# Patient Record
Sex: FEMALE | Race: WHITE | NOT HISPANIC OR LATINO | Employment: FULL TIME
[De-identification: names, ages, dates, MRNs, and addresses within clinical notes are randomized per-mention and may not be internally consistent; named-entity substitution may affect disease eponyms.]

---

## 2017-11-12 ENCOUNTER — HEALTH MAINTENANCE LETTER (OUTPATIENT)
Age: 37
End: 2017-11-12

## 2022-06-21 ENCOUNTER — TRANSFERRED RECORDS (OUTPATIENT)
Dept: MULTI SPECIALTY CLINIC | Facility: CLINIC | Age: 42
End: 2022-06-21

## 2022-06-21 LAB
HPV ABSTRACT: NORMAL
PAP-ABSTRACT: NORMAL

## 2023-02-28 ASSESSMENT — ASTHMA QUESTIONNAIRES
ACT_TOTALSCORE: 25
QUESTION_4 LAST FOUR WEEKS HOW OFTEN HAVE YOU USED YOUR RESCUE INHALER OR NEBULIZER MEDICATION (SUCH AS ALBUTEROL): NOT AT ALL
QUESTION_1 LAST FOUR WEEKS HOW MUCH OF THE TIME DID YOUR ASTHMA KEEP YOU FROM GETTING AS MUCH DONE AT WORK, SCHOOL OR AT HOME: NONE OF THE TIME
QUESTION_2 LAST FOUR WEEKS HOW OFTEN HAVE YOU HAD SHORTNESS OF BREATH: NOT AT ALL
ACT_TOTALSCORE: 25
QUESTION_3 LAST FOUR WEEKS HOW OFTEN DID YOUR ASTHMA SYMPTOMS (WHEEZING, COUGHING, SHORTNESS OF BREATH, CHEST TIGHTNESS OR PAIN) WAKE YOU UP AT NIGHT OR EARLIER THAN USUAL IN THE MORNING: NOT AT ALL
QUESTION_5 LAST FOUR WEEKS HOW WOULD YOU RATE YOUR ASTHMA CONTROL: COMPLETELY CONTROLLED

## 2023-03-02 ASSESSMENT — ENCOUNTER SYMPTOMS
HEADACHES: 0
ABDOMINAL PAIN: 0
DYSURIA: 0
COUGH: 0
NAUSEA: 0
FREQUENCY: 0
ARTHRALGIAS: 0
HEMATOCHEZIA: 0
NERVOUS/ANXIOUS: 0
CONSTIPATION: 0
BREAST MASS: 0
CHILLS: 0
HEMATURIA: 0
HEARTBURN: 0
PARESTHESIAS: 0
DIARRHEA: 0
SORE THROAT: 0
EYE PAIN: 0
WEAKNESS: 0
MYALGIAS: 0
DIZZINESS: 0
JOINT SWELLING: 0
SHORTNESS OF BREATH: 0
PALPITATIONS: 0
FEVER: 0

## 2023-03-03 ENCOUNTER — OFFICE VISIT (OUTPATIENT)
Dept: FAMILY MEDICINE | Facility: CLINIC | Age: 43
End: 2023-03-03
Payer: COMMERCIAL

## 2023-03-03 VITALS
SYSTOLIC BLOOD PRESSURE: 114 MMHG | TEMPERATURE: 97.3 F | HEIGHT: 62 IN | OXYGEN SATURATION: 96 % | BODY MASS INDEX: 30.91 KG/M2 | DIASTOLIC BLOOD PRESSURE: 66 MMHG | WEIGHT: 168 LBS | HEART RATE: 91 BPM

## 2023-03-03 DIAGNOSIS — E66.9 OBESITY (BMI 30-39.9): ICD-10-CM

## 2023-03-03 DIAGNOSIS — J45.20 MILD INTERMITTENT ASTHMA, UNSPECIFIED WHETHER COMPLICATED: ICD-10-CM

## 2023-03-03 DIAGNOSIS — Z00.00 ROUTINE GENERAL MEDICAL EXAMINATION AT A HEALTH CARE FACILITY: Primary | ICD-10-CM

## 2023-03-03 DIAGNOSIS — E83.51 LOW CALCIUM LEVELS: ICD-10-CM

## 2023-03-03 DIAGNOSIS — Z12.31 ENCOUNTER FOR SCREENING MAMMOGRAM FOR BREAST CANCER: ICD-10-CM

## 2023-03-03 DIAGNOSIS — Z13.220 SCREENING FOR HYPERLIPIDEMIA: ICD-10-CM

## 2023-03-03 DIAGNOSIS — E03.9 HYPOTHYROIDISM, UNSPECIFIED TYPE: ICD-10-CM

## 2023-03-03 DIAGNOSIS — Z86.2 HISTORY OF ANEMIA: ICD-10-CM

## 2023-03-03 LAB
ALBUMIN SERPL BCG-MCNC: 4.6 G/DL (ref 3.5–5.2)
ALP SERPL-CCNC: 102 U/L (ref 35–104)
ALT SERPL W P-5'-P-CCNC: 13 U/L (ref 10–35)
ANION GAP SERPL CALCULATED.3IONS-SCNC: 12 MMOL/L (ref 7–15)
AST SERPL W P-5'-P-CCNC: 28 U/L (ref 10–35)
BILIRUB SERPL-MCNC: 0.3 MG/DL
BUN SERPL-MCNC: 14.5 MG/DL (ref 6–20)
CALCIUM SERPL-MCNC: 10.2 MG/DL (ref 8.6–10)
CHLORIDE SERPL-SCNC: 103 MMOL/L (ref 98–107)
CHOLEST SERPL-MCNC: 227 MG/DL
CREAT SERPL-MCNC: 0.65 MG/DL (ref 0.51–0.95)
DEPRECATED HCO3 PLAS-SCNC: 25 MMOL/L (ref 22–29)
ERYTHROCYTE [DISTWIDTH] IN BLOOD BY AUTOMATED COUNT: 12.1 % (ref 10–15)
FERRITIN SERPL-MCNC: 63 NG/ML (ref 6–175)
GFR SERPL CREATININE-BSD FRML MDRD: >90 ML/MIN/1.73M2
GLUCOSE SERPL-MCNC: 98 MG/DL (ref 70–99)
HCT VFR BLD AUTO: 40.6 % (ref 35–47)
HDLC SERPL-MCNC: 60 MG/DL
HGB BLD-MCNC: 14.1 G/DL (ref 11.7–15.7)
IRON BINDING CAPACITY (ROCHE): 287 UG/DL (ref 240–430)
IRON SATN MFR SERPL: 37 % (ref 15–46)
IRON SERPL-MCNC: 106 UG/DL (ref 37–145)
LDLC SERPL CALC-MCNC: 143 MG/DL
MCH RBC QN AUTO: 29.7 PG (ref 26.5–33)
MCHC RBC AUTO-ENTMCNC: 34.7 G/DL (ref 31.5–36.5)
MCV RBC AUTO: 86 FL (ref 78–100)
NONHDLC SERPL-MCNC: 167 MG/DL
PLATELET # BLD AUTO: 342 10E3/UL (ref 150–450)
POTASSIUM SERPL-SCNC: 4.4 MMOL/L (ref 3.4–5.3)
PROT SERPL-MCNC: 8.3 G/DL (ref 6.4–8.3)
RBC # BLD AUTO: 4.74 10E6/UL (ref 3.8–5.2)
SODIUM SERPL-SCNC: 140 MMOL/L (ref 136–145)
T3FREE SERPL-MCNC: 2.9 PG/ML (ref 2–4.4)
T4 FREE SERPL-MCNC: 1.28 NG/DL (ref 0.9–1.7)
TRIGL SERPL-MCNC: 122 MG/DL
TSH SERPL DL<=0.005 MIU/L-ACNC: 1.12 UIU/ML (ref 0.3–4.2)
WBC # BLD AUTO: 8.5 10E3/UL (ref 4–11)

## 2023-03-03 PROCEDURE — 99214 OFFICE O/P EST MOD 30 MIN: CPT | Mod: 25 | Performed by: NURSE PRACTITIONER

## 2023-03-03 PROCEDURE — 84481 FREE ASSAY (FT-3): CPT | Performed by: NURSE PRACTITIONER

## 2023-03-03 PROCEDURE — 36415 COLL VENOUS BLD VENIPUNCTURE: CPT | Performed by: NURSE PRACTITIONER

## 2023-03-03 PROCEDURE — 83550 IRON BINDING TEST: CPT | Performed by: NURSE PRACTITIONER

## 2023-03-03 PROCEDURE — 84443 ASSAY THYROID STIM HORMONE: CPT | Performed by: NURSE PRACTITIONER

## 2023-03-03 PROCEDURE — 99386 PREV VISIT NEW AGE 40-64: CPT | Performed by: NURSE PRACTITIONER

## 2023-03-03 PROCEDURE — 80061 LIPID PANEL: CPT | Performed by: NURSE PRACTITIONER

## 2023-03-03 PROCEDURE — 84439 ASSAY OF FREE THYROXINE: CPT | Performed by: NURSE PRACTITIONER

## 2023-03-03 PROCEDURE — 80053 COMPREHEN METABOLIC PANEL: CPT | Performed by: NURSE PRACTITIONER

## 2023-03-03 PROCEDURE — 82728 ASSAY OF FERRITIN: CPT | Performed by: NURSE PRACTITIONER

## 2023-03-03 PROCEDURE — 83540 ASSAY OF IRON: CPT | Performed by: NURSE PRACTITIONER

## 2023-03-03 PROCEDURE — 85027 COMPLETE CBC AUTOMATED: CPT | Performed by: NURSE PRACTITIONER

## 2023-03-03 RX ORDER — ALBUTEROL SULFATE 90 UG/1
2 AEROSOL, METERED RESPIRATORY (INHALATION) EVERY 6 HOURS PRN
Qty: 18 G | Refills: 3 | Status: SHIPPED | OUTPATIENT
Start: 2023-03-03

## 2023-03-03 RX ORDER — LEVOTHYROXINE SODIUM 50 MCG
TABLET ORAL
COMMUNITY
Start: 2022-12-28 | End: 2024-04-03

## 2023-03-03 RX ORDER — CHOLECALCIFEROL (VITAMIN D3) 50 MCG
TABLET ORAL
COMMUNITY

## 2023-03-03 ASSESSMENT — ENCOUNTER SYMPTOMS
DYSURIA: 0
ARTHRALGIAS: 0
DIARRHEA: 0
COUGH: 0
FEVER: 0
NERVOUS/ANXIOUS: 0
BREAST MASS: 0
WEAKNESS: 0
EYE PAIN: 0
DIZZINESS: 0
HEMATOCHEZIA: 0
NAUSEA: 0
CONSTIPATION: 0
HEADACHES: 0
PALPITATIONS: 0
ABDOMINAL PAIN: 0
FREQUENCY: 0
SORE THROAT: 0
CHILLS: 0
JOINT SWELLING: 0
HEMATURIA: 0
PARESTHESIAS: 0
SHORTNESS OF BREATH: 0
MYALGIAS: 0
HEARTBURN: 0

## 2023-03-03 NOTE — LETTER
My Asthma Action Plan    Name: Pily Vargas   YOB: 1980  Date: 3/3/2023   My doctor: TRELL Omalley CNP   My clinic: [facility] [place]        My Rescue Medicine:   Albuterol inhaler (Proair/Ventolin/Proventil HFA)  2-4 puffs EVERY 4 HOURS as needed. Use a spacer if recommended by your provider.   My Asthma Severity:   Intermittent / Exercise Induced  Know your asthma triggers: upper respiratory infections             GREEN ZONE   Good Control    I feel good    No cough or wheeze    Can work, sleep and play without asthma symptoms       Take your asthma control medicine every day.     1. If exercise triggers your asthma, take your rescue medication    15 minutes before exercise or sports, and    During exercise if you have asthma symptoms  2. Spacer to use with inhaler: If you have a spacer, make sure to use it with your inhaler             YELLOW ZONE Getting Worse  I have ANY of these:    I do not feel good    Cough or wheeze    Chest feels tight    Wake up at night   1. Keep taking your Green Zone medications  2. Start taking your rescue medicine:    every 20 minutes for up to 1 hour. Then every 4 hours for 24-48 hours.  3. If you stay in the Yellow Zone for more than 12-24 hours, contact your doctor.  4. If you do not return to the Green Zone in 12-24 hours or you get worse, start taking your oral steroid medicine if prescribed by your provider.           RED ZONE Medical Alert - Get Help  I have ANY of these:    I feel awful    Medicine is not helping    Breathing getting harder    Trouble walking or talking    Nose opens wide to breathe       1. Take your rescue medicine NOW  2. If your provider has prescribed an oral steroid medicine, start taking it NOW  3. Call your doctor NOW  4. If you are still in the Red Zone after 20 minutes and you have not reached your doctor:    Take your rescue medicine again and    Call 911 or go to the emergency room right away    See your  regular doctor within 2 weeks of an Emergency Room or Urgent Care visit for follow-up treatment.          Annual Reminders:  Meet with Asthma Educator,  Flu Shot in the Fall, consider Pneumonia Vaccination for patients with asthma (aged 19 and older).    Pharmacy:    [id number] [address]  [id number] [place] [facility] [address] [place] [address] [address]  [id number] [place], [place] - [address] [facility] [place] - [address]    Electronically signed by TRELL Omalley CNP   Date: 03/03/23                    Asthma Triggers  How To Control Things That Make Your Asthma Worse    Triggers are things that make your asthma worse.  Look at the list below to help you find your triggers and   what you can do about them. You can help prevent asthma flare-ups by staying away from your triggers.      Trigger                                                          What you can do   Cigarette Smoke  Tobacco smoke can make asthma worse. Do not allow smoking in your home, car or around you.  Be sure no one smokes at a child s day care or school.  If you smoke, ask your health care provider for ways to help you quit.  Ask family members to quit too.  Ask your health care provider for a referral to Quit Plan to help you quit smoking, or call 5-459-284-PLAN.     Colds, Flu, Bronchitis  These are common triggers of asthma. Wash your hands often.  Don t touch your eyes, nose or mouth.  Get a flu shot every year.     Dust Mites  These are tiny bugs that live in cloth or carpet. They are too small to see. Wash sheets and blankets in hot water every week.   Encase pillows and mattress in dust mite proof covers.  Avoid having carpet if you can. If you have carpet, vacuum weekly.   Use a dust mask and HEPA vacuum.   Pollen and Outdoor Mold  Some people are allergic to trees, grass, or weed pollen, or molds. Try to keep your windows  closed.  Limit time out doors when pollen count is high.   Ask you health care provider about taking medicine during allergy season.     Animal Dander  Some people are allergic to skin flakes, urine or saliva from pets with fur or feathers. Keep pets with fur or feathers out of your home.    If you can t keep the pet outdoors, then keep the pet out of your bedroom.  Keep the bedroom door closed.  Keep pets off cloth furniture and away from stuffed toys.     Mice, Rats, and Cockroaches  Some people are allergic to the waste from these pests.   Cover food and garbage.  Clean up spills and food crumbs.  Store grease in the refrigerator.   Keep food out of the bedroom.   Indoor Mold  This can be a trigger if your home has high moisture. Fix leaking faucets, pipes, or other sources of water.   Clean moldy surfaces.  Dehumidify basement if it is damp and smelly.   Smoke, Strong Odors, and Sprays  These can reduce air quality. Stay away from strong odors and sprays, such as perfume, powder, hair spray, paints, smoke incense, paint, cleaning products, candles and new carpet.   Exercise or Sports  Some people with asthma have this trigger. Be active!  Ask your doctor about taking medicine before sports or exercise to prevent symptoms.    Warm up for 5-10 minutes before and after sports or exercise.     Other Triggers of Asthma  Cold air:  Cover your nose and mouth with a scarf.  Sometimes laughing or crying can be a trigger.  Some medicines and food can trigger asthma.

## 2023-03-03 NOTE — PROGRESS NOTES
SUBJECTIVE:   CC: Pily is an 42 year old who presents for preventive health visit.   Patient has been advised of split billing requirements and indicates understanding: Yes  Healthy Habits:     Getting at least 3 servings of Calcium per day:  NO    Bi-annual eye exam:  Yes    Dental care twice a year:  Yes    Sleep apnea or symptoms of sleep apnea:  None    Diet:  Regular (no restrictions)    Frequency of exercise:  1 day/week    Duration of exercise:  15-30 minutes    Taking medications regularly:  Yes    Medication side effects:  None    PHQ-2 Total Score: 0    Additional concerns today:  Yes    Jeremy luciano of  team.    OBGYN for pap and thyroid management requests thyroid labs here.     Please abstract the following data from this visit with this patient into the appropriate field in Epic:    Tests that can be patient reported without a hard copy:    Pap smear done on this date:  (approximately), by this group: [facility] results were normal     Note to Abstraction: If this section is blank, no results were found via Chart Review/Care Everywhere.            1/16/2014     8:08 AM 1/16/2014     8:49 AM 2/24/2023    10:13 AM   ACT Total Scores   ACT Total Score 21 21    asthma er visits 0 = None 0 = None    Asthma hosp visits 0 = None 0 = None    ACT TOTAL SCORE (Goal Greater than or Equal to 20)   25    25   In the past 12 months, how many times did you visit the emergency room for your asthma without being admitted to the hospital?   0    0   In the past 12 months, how many times were you hospitalized overnight because of your asthma?   0    0       Today's PHQ-2 Score:       3/2/2023     3:49 PM   PHQ-2 ( 1999 [employer])   Q1: Little interest or pleasure in doing things 0   Q2: Feeling down, depressed or hopeless 0   PHQ-2 Score 0   Q1: Little interest or pleasure in doing things Not at all   Q2: Feeling down, depressed or hopeless Not at all   PHQ-2 Score 0       Have you ever  done Advance Care Planning? (For example, a Health Directive, POLST, or a discussion with a medical provider or your loved ones about your wishes): Declined    Social History     Tobacco Use     Smoking status: Never     Smokeless tobacco: Never   Vaping Use     Vaping status: Not on file   Substance Use Topics     Alcohol use: Yes     Comment: So very rarely           3/2/2023     3:49 PM   Alcohol Use   Prescreen: >3 drinks/day or >7 drinks/week? Not Applicable          View : No data to display.                Reviewed orders with patient.  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     Past Surgical History:   Procedure Laterality Date     ABDOMEN SURGERY  2022         BIOPSY  1997    Lung issues when I was young     COLONOSCOPY  2020    Ulcerative Proctitis     GYN SURGERY  2022         no surgeries         Social History     Tobacco Use     Smoking status: Never     Smokeless tobacco: Never   Vaping Use     Vaping status: Not on file   Substance Use Topics     Alcohol use: Yes     Comment: So very rarely     Family History   Problem Relation Age of Onset     Cancer Maternal Grandmother         Pancreatic      Diabetes Maternal Grandmother      Cardiovascular Maternal Grandmother         Few MI's         Current Outpatient Medications   Medication Sig Dispense Refill     albuterol (PROAIR HFA/PROVENTIL HFA/VENTOLIN HFA) 108 (90 Base) MCG/ACT inhaler Inhale 2 puffs into the  lungs every 6 hours as needed for shortness of breath Profile Rx: patient will contact pharmacy when needed 18 g 3     Ferrous Sulfate 50 MG TBCR        loratadine (CLARITIN) 10 MG tablet Take 1 tablet by mouth daily. 90 tablet 3     methylcellulose (CITRUCEL) powder        Multiple Vitamin (MULTI-VITAMIN) per tablet Take 1 tablet by mouth daily. 90 tablet 3     SYNTHROID 50 MCG tablet        vitamin D3 (CHOLECALCIFEROL) 50 mcg (2000 units) tablet        Allergies   Allergen Reactions     Dust Mites Other (See Comments)     Minocycline Hives     Pt thinks it was this one, it was prescribed by her dermatologist.        Breast Cancer Screenin/24/2023    10:13 AM 3/16/2023     8:50 AM   Breast CA Risk Assessment (FHS-7)   Do you have a family history of breast, colon, or ovarian cancer? No / Unknown    No / Unknown No / Unknown     Mammogram Screening - Offered annual screening and updated Health Maintenance for mutual plan based on risk factor consideration    Pertinent mammograms are reviewed under the imaging tab.    History of abnormal Pap smear: NO - age 30-65 PAP every 3 years with negative HPV co-testing recommended      2014    12:00 AM 1/15/2013     2:19 PM 2012     7:59 AM   PAP / HPV   PAP (Historical) NIL   NIL   ASC-US       Reviewed and updated as needed this visit by clinical staff   Tobacco  Allergies  Meds  Problems  Med Hx  Surg Hx  Fam Hx          Reviewed and updated as needed this visit by Provider   Tobacco  Allergies  Meds  Problems  Med Hx  Surg Hx  Fam Hx           Review of Systems   Constitutional: Negative for chills and fever.   HENT: Negative for congestion, ear pain, hearing loss and sore throat.    Eyes: Negative for pain and visual disturbance.   Respiratory: Negative for cough and shortness of breath.    Cardiovascular: Negative for chest pain, palpitations and peripheral edema.   Gastrointestinal: Negative for abdominal pain, constipation, diarrhea,  "heartburn, hematochezia and nausea.   Breasts:  Negative for tenderness, breast mass and discharge.   Genitourinary: Negative for dysuria, frequency, genital sores, hematuria, pelvic pain, urgency, vaginal bleeding and vaginal discharge.   Musculoskeletal: Negative for arthralgias, joint swelling and myalgias.   Skin: Negative for rash.   Neurological: Negative for dizziness, weakness, headaches and paresthesias.   Psychiatric/Behavioral: Negative for mood changes. The patient is not nervous/anxious.      OBJECTIVE:   /66 (BP Location: Left arm, Patient Position: Chair, Cuff Size: Adult Large)   Pulse 91   Temp 97.3  F (36.3  C) (Tympanic)   Ht 1.562 m (5' 1.5\")   Wt 76.2 kg (168 lb)   LMP 02/19/2023 (Exact Date)   SpO2 96%   BMI 31.23 kg/m    Physical Exam  GENERAL: healthy, alert and no distress  EYES: Eyes grossly normal to inspection, PERRL and conjunctivae and sclerae normal  HENT: ear canals and TM's normal, nose and mouth without ulcers or lesions  NECK: no adenopathy, no asymmetry, masses, or scars and thyroid normal to palpation  RESP: lungs clear to auscultation - no rales, rhonchi or wheezes  BREAST: normal without masses, tenderness or nipple discharge and no palpable axillary masses or adenopathy  CV: regular rate and rhythm, normal S1 S2, no S3 or S4, no murmur, click or rub, no peripheral edema and peripheral pulses strong  ABDOMEN: soft, nontender, no hepatosplenomegaly, no masses and bowel sounds normal  MS: no gross musculoskeletal defects noted, no edema  SKIN: no suspicious lesions or rashes  NEURO: Normal strength and tone, mentation intact and speech normal  PSYCH: mentation appears normal, affect normal/bright    Diagnostic Test Results:  Labs reviewed in Epic    ASSESSMENT/PLAN:   Pily was seen today for establish care and physical.    Diagnoses and all orders for this visit:    Routine general medical examination at a health care facility  Well woman exam with breast exam " completed today.    Fasting labs today.    Will notify of lab results.    Mild intermittent asthma, unspecified whether complicated  No concerns.  Stable.  Continue same medication this was refilled today.  -     albuterol (PROAIR HFA/PROVENTIL HFA/VENTOLIN HFA) 108 (90 Base) MCG/ACT inhaler; Inhale 2 puffs into the lungs every 6 hours as needed for shortness of breath Profile Rx: patient will contact pharmacy when needed    Obesity (BMI 30-39.9)  Labs.   Consider referral if desired for weight management.   -     Comprehensive metabolic panel (BMP + Alb, Alk Phos, ALT, AST, Total. Bili, TP); Future  -     Comprehensive metabolic panel (BMP + Alb, Alk Phos, ALT, AST, Total. Bili, TP)    Hypothyroidism, unspecified type  Managed elsewhere.   Would be glad to take over if desired.   -     Comprehensive metabolic panel (BMP + Alb, Alk Phos, ALT, AST, Total. Bili, TP); Future  -     TSH; Future  -     T4, free; Future  -     T3, Free; Future  -     Comprehensive metabolic panel (BMP + Alb, Alk Phos, ALT, AST, Total. Bili, TP)  -     TSH  -     T4, free  -     T3, Free    Low calcium levels  -     Calcium; Future  -     Vitamin D Deficiency; Future    History of anemia  -     CBC with platelets; Future  -     Ferritin; Future  -     Iron and iron binding capacity; Future  -     CBC with platelets  -     Ferritin  -     Iron and iron binding capacity    Screening for hyperlipidemia  -     Lipid panel reflex to direct LDL Fasting; Future  -     Lipid panel reflex to direct LDL Fasting    Encounter for screening mammogram for breast cancer  -     MA Screen Bilateral w/Rahul; Future    Other orders  -     REVIEW OF HEALTH MAINTENANCE PROTOCOL ORDERS        Patient has been advised of split billing requirements and indicates understanding: Yes      COUNSELING:  Reviewed preventive health counseling, as reflected in patient instructions    She reports that she has never smoked. She has never used smokeless  tobacco.                TRELL Omalley CNP  [facility] [place]

## 2023-03-12 ENCOUNTER — DOCUMENTATION ONLY (OUTPATIENT)
Dept: LAB | Facility: CLINIC | Age: 43
End: 2023-03-12
Payer: COMMERCIAL

## 2023-03-12 NOTE — RESULT ENCOUNTER NOTE
Dear Pily,    Here is a summary of your recent test results:    -LDL(bad) cholesterol level is elevated which can increase your heart disease risk.  A diet high in fat and simple carbohydrates, genetics and being overweight can contribute to this. ADVISE: exercising 150 minutes of aerobic exercise per week (30 minutes for 5 days per week or 50 minutes for 3 days per week are options) and eating a low saturated fat/low carbohydrate diet are helpful to improve this.    Calcium is elevated.  This could be from low Vitamin D. Encourage vitamin D supplement 2,000 international units daily  ADVISE: rechecking this in 3 month.      Please call us at [phone] (or use [employer]) to address the above recommendations if needed.    Thank you for choosing [facility].  It was an honor and a privilege to participate in your care.       Healthy regards,    Sheila Acuna, JULIOCESAR  [facility]

## 2023-03-16 ENCOUNTER — LAB (OUTPATIENT)
Dept: LAB | Facility: CLINIC | Age: 43
End: 2023-03-16
Payer: COMMERCIAL

## 2023-03-16 ENCOUNTER — ANCILLARY PROCEDURE (OUTPATIENT)
Dept: MAMMOGRAPHY | Facility: CLINIC | Age: 43
End: 2023-03-16
Attending: NURSE PRACTITIONER
Payer: COMMERCIAL

## 2023-03-16 DIAGNOSIS — Z12.31 ENCOUNTER FOR SCREENING MAMMOGRAM FOR BREAST CANCER: ICD-10-CM

## 2023-03-16 DIAGNOSIS — E83.51 LOW CALCIUM LEVELS: ICD-10-CM

## 2023-03-16 LAB — CALCIUM SERPL-MCNC: 9.5 MG/DL (ref 8.6–10)

## 2023-03-16 PROCEDURE — 77067 SCR MAMMO BI INCL CAD: CPT | Mod: TC | Performed by: RADIOLOGY

## 2023-03-16 PROCEDURE — 36415 COLL VENOUS BLD VENIPUNCTURE: CPT

## 2023-03-16 PROCEDURE — 82306 VITAMIN D 25 HYDROXY: CPT

## 2023-03-16 PROCEDURE — 82310 ASSAY OF CALCIUM: CPT

## 2023-03-16 PROCEDURE — 77063 BREAST TOMOSYNTHESIS BI: CPT | Mod: TC | Performed by: RADIOLOGY

## 2023-03-17 LAB — DEPRECATED CALCIDIOL+CALCIFEROL SERPL-MC: 53 UG/L (ref 20–75)

## 2023-03-18 NOTE — RESULT ENCOUNTER NOTE
Dear Pily,    Here is a summary of your recent test results:    -Mammogram was normal.  ADVISE: rechecking in 1 year.    In addition, here is a list of due or overdue Health Maintenance reminders:    Discuss Advance Care Planning Never done  Hepatitis B Vaccine(1 of 3 - 3-dose series) Never done  Yearly Preventive Visit due on 01/16/2015  PAP Smear due on 01/16/2017    Please call us at [phone] (or use [employer]) to address the above recommendations or have any questions.    Thank you for choosing [facility].  It was an honor and a privilege to participate in your care today.         Healthy regards,      Sheila GANDARA

## 2023-03-21 NOTE — RESULT ENCOUNTER NOTE
Dear Pily,    Here is a summary of your recent test results:    -Calcium is normal.  -Vitamin D level is normal and getting 1000 IU daily in your diet or supplements is recommended.     Please call us at [phone] (or use [employer]) to address the above recommendations if needed.    Thank you for choosing [facility].  It was an honor and a privilege to participate in your care.       Healthy regards,    Sheila Acuna, JULIOCESAR  [facility]

## 2023-04-17 ENCOUNTER — MYC MEDICAL ADVICE (OUTPATIENT)
Dept: FAMILY MEDICINE | Facility: CLINIC | Age: 43
End: 2023-04-17

## 2023-04-18 ENCOUNTER — VIRTUAL VISIT (OUTPATIENT)
Dept: FAMILY MEDICINE | Facility: CLINIC | Age: 43
End: 2023-04-18
Payer: COMMERCIAL

## 2023-04-18 DIAGNOSIS — R21 RASH: Primary | ICD-10-CM

## 2023-04-18 DIAGNOSIS — L30.9 DERMATITIS: ICD-10-CM

## 2023-04-18 DIAGNOSIS — L85.3 DRY SKIN: ICD-10-CM

## 2023-04-18 DIAGNOSIS — L29.9 ITCHING: ICD-10-CM

## 2023-04-18 PROCEDURE — 99214 OFFICE O/P EST MOD 30 MIN: CPT | Mod: VID | Performed by: NURSE PRACTITIONER

## 2023-04-18 RX ORDER — HYDROXYZINE HYDROCHLORIDE 25 MG/1
12.5-25 TABLET, FILM COATED ORAL 3 TIMES DAILY PRN
Qty: 30 TABLET | Refills: 0 | Status: SHIPPED | OUTPATIENT
Start: 2023-04-18 | End: 2024-03-01

## 2023-04-18 RX ORDER — TRIAMCINOLONE ACETONIDE 1 MG/G
CREAM TOPICAL 2 TIMES DAILY
Qty: 30 G | Refills: 1 | Status: SHIPPED | OUTPATIENT
Start: 2023-04-18 | End: 2024-06-05

## 2023-04-18 NOTE — TELEPHONE ENCOUNTER
Please review MyChart message   See pictures of rash for virtual appointment yesterday 4/17/23    Lacie HERNANDEZ RN   [facility] Triage

## 2023-04-18 NOTE — PATIENT INSTRUCTIONS
Steroid cream as needed twice daily for more than 2 weeks in a row avoid face and flexure regions.    Can take Claritin 10 mg twice daily for 3 to 7 days.    Hydroxyzine as needed for itch will be sedating okay to take half a tab.

## 2023-04-18 NOTE — PROGRESS NOTES
"Pily is a 42 year old who is being evaluated via a billable video visit.      How would you like to obtain your AVS? [employer]  If the video visit is dropped, the invitation should be resent by: Text to cell phone: [phone]  Will anyone else be joining your video visit? No    Assessment & Plan     Rash  Reassurance provided.  Pictures sent through [employer] were evaluated.  Could be a dermatitis such as eczema versus contact; however looks extremely dry in her upper arm like a keratosis Pilaris as well.    Encouraged her to discontinue antibiotic ointment.  There is no infection noted in contact and antibiotic ointment could worsen symptoms.    Encourage triamcinolone steroid cream twice daily.  Claritin 10 mg twice daily for 3 to 7 days.  Hydroxyzine as needed for itch.  Good moisturizing lotion for Keratosis Pilaris at bedtime such as CeraVe.  There is also keratosis pilaris specific lotions like Goldbond etc.  Dermatology referral for accurate diagnoses.  She can send me an E-visit if symptoms worsen and will consider oral steroids if significant.  Pily verbalizes understanding of plan of care and is in agreement.   - Adult Dermatology Referral  - hydrOXYzine (ATARAX) 25 MG tablet  Dispense: 30 tablet; Refill: 0  - triamcinolone (KENALOG) 0.1 % external cream  Dispense: 30 g; Refill: 1    Dermatitis    - Adult Dermatology Referral  - hydrOXYzine (ATARAX) 25 MG tablet  Dispense: 30 tablet; Refill: 0  - triamcinolone (KENALOG) 0.1 % external cream  Dispense: 30 g; Refill: 1    Dry skin      Itching        BMI:   Estimated body mass index is 31.23 kg/m  as calculated from the following:    Height as of 3/3/23: 1.562 m (5' 1.5\").    Weight as of 3/3/23: 76.2 kg (168 lb).     Return in about 2 weeks (around 5/2/2023) for Recheck, if symptoms persist or worsening please be seen.      TRELL Omalley [facility]    Subjective   Pily is a 42 year old, presenting for the following health " issues:  Derm Problem        2023     9:50 AM   Additional Questions   Roomed by [facility]     HPI     Rash  Onset/Duration: x18 days  Description  Location: top and sides of forearms moving to underside of forearms and hands and up arms  Character: raised, red, mole becomes inflamed, some spots are darker. Ears feel like a sunburn sides and lobes. Rash gets worse with temperature change. Went to [place] altitude seems to make it worse on airplane  Itchin-8/10  Intensity:  moderate  Progression of Symptoms:  Worsening-spreading and constant  Accompanying signs and symptoms:   Fever: No  Body aches or joint pain: No  Sore throat symptoms: No  Recent cold symptoms: After rash started.  History:           Previous episodes of similar rash: None  New exposures:  None  Recent travel: YES- to [place] - already had rash  Exposure to similar rash: No  Precipitating or alleviating factors: n/a -- does not seem contagious - 11 month old daughter and  do not have the rash  Therapies tried and outcome: Ibuprofen 800mg, antibiotic cream to control itching-no relief    Has been putting antibiotic cream on it with no resolve.  No exposure to outside elements poison ivy etc.  No new medications.      Review of Systems   Constitutional, HEENT, cardiovascular, pulmonary, GI, , musculoskeletal, neuro, skin, endocrine and psych systems are negative, except as otherwise noted in the HPI.      Objective           Vitals:  No vitals were obtained today due to virtual visit.    Physical Exam   GENERAL: Healthy, alert and no distress  EYES: Eyes grossly normal to inspection.  No discharge or erythema, or obvious scleral/conjunctival abnormalities.  RESP: No audible wheeze, cough, or visible cyanosis.  No visible retractions or increased work of breathing.    SKIN: Patient uploaded images in my chart noted below.  NEURO: Cranial nerves grossly intact.  Mentation and speech appropriate for age.  PSYCH: Mentation appears  normal, affect normal/bright, judgement and insight intact, normal speech and appearance well-groomed.                      Video-Visit Details    Type of service:  Video Visit     Originating Location (pt. Location): Home  Distant Location (provider location):  On-site  Platform used for Video Visit: Kayla

## 2023-07-25 ENCOUNTER — OFFICE VISIT (OUTPATIENT)
Dept: DERMATOLOGY | Facility: CLINIC | Age: 43
End: 2023-07-25
Payer: COMMERCIAL

## 2023-07-25 DIAGNOSIS — D48.5 NEOPLASM OF UNCERTAIN BEHAVIOR OF SKIN: ICD-10-CM

## 2023-07-25 DIAGNOSIS — L71.0 PERIORAL DERMATITIS: Primary | ICD-10-CM

## 2023-07-25 PROCEDURE — 11102 TANGNTL BX SKIN SINGLE LES: CPT | Performed by: PHYSICIAN ASSISTANT

## 2023-07-25 PROCEDURE — 99204 OFFICE O/P NEW MOD 45 MIN: CPT | Mod: 25 | Performed by: PHYSICIAN ASSISTANT

## 2023-07-25 PROCEDURE — 88305 TISSUE EXAM BY PATHOLOGIST: CPT | Performed by: DERMATOLOGY

## 2023-07-25 RX ORDER — AZITHROMYCIN 250 MG/1
TABLET, FILM COATED ORAL
Qty: 24 TABLET | Refills: 0 | Status: SHIPPED | OUTPATIENT
Start: 2023-07-25 | End: 2024-03-01

## 2023-07-25 RX ORDER — METRONIDAZOLE 7.5 MG/G
GEL TOPICAL
Qty: 45 G | Refills: 11 | Status: SHIPPED | OUTPATIENT
Start: 2023-07-25

## 2023-07-25 NOTE — LETTER
7/25/2023         RE: Pily Vargas  [address]  [facility] [address]        Dear Colleague,    Thank you for referring your patient, Pily Vargas, to the [facility]. Please see a copy of my visit note below.    HPI:   Chief complaints: Pily Vargas is a pleasant 42 year old female who presents for evaluation of a spot on the chest and a pimple like rash on the face. The spot on the chest has been present for several years and is irritated She would josé the area removed. The rash has been present for a few months. She will get small red bumps and irritation around the nose, mouth and chin. She has used moisturizers but these do not seem to be helping.       PHYSICAL EXAM:    There were no vitals taken for this visit.  Skin exam performed as follows: Type 2 skin. Mood appropriate  Alert and Oriented X 3. Well developed, well nourished in no distress.  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 Shave biopsy performed.  Area cleaned and anesthetized with 1% lidocaine with epinephrine.  Dermablade used to remove the lesion and sent to pathology. Bleeding was cauterized. Pt tolerated procedure well with no complications.   Perioral dermatitis - advised on diagnosis and treatment options. Discussed PO and topical medications.   --Start azithromycin 250 mg M, W and F x 2 months then stop  --Start metrogel BID          Follow-up: PRN  CC:   Scribed By: Shyanne Franco, MS, PAKASI      Again, thank you for allowing me to participate in the care of your patient.        Sincerely,        Shyanne Franco PA-C

## 2023-07-25 NOTE — PROGRESS NOTES
HPI:   Chief complaints: Pily Vargas is a pleasant 42 year old female who presents for evaluation of a spot on the chest and a pimple like rash on the face. The spot on the chest has been present for several years and is irritated She would josé the area removed. The rash has been present for a few months. She will get small red bumps and irritation around the nose, mouth and chin. She has used moisturizers but these do not seem to be helping.       PHYSICAL EXAM:    There were no vitals taken for this visit.  Skin exam performed as follows: Type 2 skin. Mood appropriate  Alert and Oriented X 3. Well developed, well nourished in no distress.  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 Shave biopsy performed.  Area cleaned and anesthetized with 1% lidocaine with epinephrine.  Dermablade used to remove the lesion and sent to pathology. Bleeding was cauterized. Pt tolerated procedure well with no complications.   Perioral dermatitis - advised on diagnosis and treatment options. Discussed PO and topical medications.   --Start azithromycin 250 mg M, W and F x 2 months then stop  --Start metrogel BID          Follow-up: PRN  CC:   Scribed By: Shyanne Franco, MS, PA-C

## 2023-07-25 NOTE — PATIENT INSTRUCTIONS
Wound Care Instructions     FOR SUPERFICIAL WOUNDS     [facility]  [phone]                 AFTER 24 HOURS YOU SHOULD REMOVE THE BANDAGE AND BEGIN DAILY DRESSING CHANGES AS FOLLOWS:     1) Remove Dressing.     2) Clean and dry the area with tap water using a Q-tip or sterile gauze pad.     3) Apply Vaseline, Aquaphor, Polysporin ointment or Bacitracin ointment over entire wound.  Do NOT use Neosporin ointment.     4) Cover the wound with a band-aid, or a sterile non-stick gauze pad and micropore paper tape    REPEAT THESE INSTRUCTIONS AT LEAST ONCE A DAY UNTIL THE WOUND HAS COMPLETELY HEALED.    It is an old wives tale that a wound heals better when it is exposed to air and allowed to dry out. The wound will heal faster with a better cosmetic result if it is kept moist with ointment and covered with a bandage.    **Do not let the wound dry out.**    Supplies Needed:      *Cotton tipped applicators (Q-tips)    *Vaseline, Aquaphor, Polysporin or Bacitracin Ointment (NOT NEOSPORIN)    *Band-aids or non-stick gauze pads and micropore paper tape.      PATIENT INFORMATION:    During the healing process you will notice a number of changes. All wounds develop a small halo of redness surrounding the wound.  This means healing is occurring. Severe itching with extensive redness usually indicates sensitivity to the ointment or bandage tape used to dress the wound.  You should call our office if this develops.      Swelling  and/or discoloration around your surgical site is common, particularly when performed around the eye.    All wounds normally drain.  The larger the wound the more drainage there will be.  After 7-10 days, you will notice the wound beginning to shrink and new skin will begin to grow.  The wound is healed when you can see skin has formed over the entire area.  A healed wound has a healthy, shiny look to the surface and is red to dark pink in color to normalize.  Wounds may  take approximately 4-6 weeks to heal.  Larger wounds may take 6-8 weeks.  After the wound is healed you may discontinue dressing changes.    You may experience a sensation of tightness as your wound heals. This is normal and will gradually subside.    Your healed wound may be sensitive to temperature changes. This sensitivity improves with time, but if you re having a lot of discomfort, try to avoid temperature extremes.    Patients frequently experience itching after their wound appears to have healed because of the continue healing under the skin.  Plain Vaseline will help relieve the itching.      POSSIBLE COMPLICATIONS    BLEEDING:    Leave the bandage in place.  Use tightly rolled up gauze or a cloth to apply direct pressure over the bandage for 30  minutes.  Reapply pressure for an additional 30 minutes if necessary  Use additional gauze and tape to maintain pressure once the bleeding has stopped.

## 2023-07-27 LAB
PATH REPORT.COMMENTS IMP SPEC: NORMAL
PATH REPORT.COMMENTS IMP SPEC: NORMAL
PATH REPORT.FINAL DX SPEC: NORMAL
PATH REPORT.GROSS SPEC: NORMAL
PATH REPORT.MICROSCOPIC SPEC OTHER STN: NORMAL
PATH REPORT.RELEVANT HX SPEC: NORMAL

## 2023-10-23 ENCOUNTER — MYC MEDICAL ADVICE (OUTPATIENT)
Dept: FAMILY MEDICINE | Facility: CLINIC | Age: 43
End: 2023-10-23
Payer: COMMERCIAL

## 2023-10-23 DIAGNOSIS — J30.2 SEASONAL ALLERGIES: Primary | ICD-10-CM

## 2023-10-23 DIAGNOSIS — E66.9 OBESITY (BMI 30-39.9): ICD-10-CM

## 2023-10-23 DIAGNOSIS — J30.81 ANIMAL DANDER ALLERGY: ICD-10-CM

## 2023-10-25 ENCOUNTER — TELEPHONE (OUTPATIENT)
Dept: ALLERGY | Facility: CLINIC | Age: 43
End: 2023-10-25
Payer: COMMERCIAL

## 2023-10-25 NOTE — TELEPHONE ENCOUNTER
LOV 4/18/2023    Wt Readings from Last 2 Encounters:   03/03/23 76.2 kg (168 lb)   05/30/14 62.6 kg (138 lb)     Please see my chart message below     Please review and advise     Thank you     Romelia Taylor RN, BSN  [facility]

## 2023-10-25 NOTE — TELEPHONE ENCOUNTER
[facility] Call Center    Phone Message    May a detailed message be left on voicemail: yes     Reason for Call: Appointment Intake    Referring Provider Name: Sheila Acuna APRN CNP in  FAMILY PRACTICE  Diagnosis and/or Symptoms: Seasonal allergies [J30.2] Animal dander allergy [J30.81]       Patient is possibly unable to stop her allergy medications 7 days prior to her appointment on 3/1/24 with Dr. Lomas as, she takes them everyday. Routing to clinic per protocols.     Action Taken: Other: CS Allergy    Travel Screening: Not Applicable

## 2023-10-26 NOTE — TELEPHONE ENCOUNTER
Writer left second voicemail. Explained to patient that she may continue to take her antihistamine and that allergies can be evaluated by blood testing.     KRISTIE Raza, RN  10/26/2023 3:55 PM

## 2023-10-26 NOTE — TELEPHONE ENCOUNTER
Patient returning call. Patient states she cannot stop using Claritin for 7 days prior to appt and wants to know how to proceed from here. Please call back [phone] Thank you

## 2024-01-04 ENCOUNTER — TELEPHONE (OUTPATIENT)
Dept: FAMILY MEDICINE | Facility: CLINIC | Age: 44
End: 2024-01-04
Payer: COMMERCIAL

## 2024-01-04 ENCOUNTER — NURSE TRIAGE (OUTPATIENT)
Dept: FAMILY MEDICINE | Facility: CLINIC | Age: 44
End: 2024-01-04
Payer: COMMERCIAL

## 2024-01-04 DIAGNOSIS — U07.1 INFECTION DUE TO 2019 NOVEL CORONAVIRUS: Primary | ICD-10-CM

## 2024-01-04 NOTE — TELEPHONE ENCOUNTER
Reason for Disposition    HIGH RISK patient (e.g., weak immune system, age > 64 years, obesity with BMI of 30 or higher, pregnant, chronic lung disease or other chronic medical condition) and COVID symptoms (e.g., cough, fever)  (Exceptions: Already seen by doctor or NP/PA and no new or worsening symptoms.)    Additional Information    Negative: Difficult to awaken or acting confused (e.g., disoriented, slurred speech)    Negative: SEVERE difficulty breathing (e.g., struggling for each breath, speaks in single words)    Negative: Bluish (or gray) lips or face now    Negative: Shock suspected (e.g., cold/pale/clammy skin, too weak to stand, low BP, rapid pulse)    Negative: Sounds like a life-threatening emergency to the triager    Negative: SEVERE or constant chest pain or pressure  (Exception: Mild central chest pain, present only when coughing.)    Negative: MODERATE difficulty breathing (e.g., speaks in phrases, SOB even at rest, pulse 100-120)    Negative: Headache and stiff neck (can't touch chin to chest)    Negative: Chest pain or pressure  (Exception: MILD central chest pain, present only when coughing.)    Negative: Drinking very little and dehydration suspected (e.g., no urine > 12 hours, very dry mouth, very lightheaded)    Negative: Patient sounds very sick or weak to the triager    Negative: MILD difficulty breathing (e.g., minimal/no SOB at rest, SOB with walking, pulse <100)    Negative: Fever > 103 F (39.4 C)    Negative: Fever > 101 F (38.3 C) and over 60 years of age    Negative: Fever > 100.0 F (37.8 C) and bedridden (e.g., CVA, chronic illness, recovering from surgery)    Protocols used: Coronavirus (COVID-19) Diagnosed or [id number]

## 2024-01-04 NOTE — CONFIDENTIAL NOTE
RN COVID TREATMENT VISIT  01/04/24    The patient has been triaged and does not require a higher level of care.    Pily Vargas  43 year old  Current weight? 168 lb    Has the patient been seen by a primary care provider at an [facility] or [facility] within the past two years? Yes.   Have you been in close proximity to/do you have a known exposure to a person with a confirmed case of influenza? No.     General treatment eligibility:  Date of positive COVID test (PCR or at home)?  1/4/24    Are you or have you been hospitalized for this COVID-19 infection? No.   Have you received monoclonal antibodies or antiviral treatment for COVID-19 since this positive test? No.   Do you have any of the following conditions that place you at risk of being very sick from COVID-19?   - Overweight or Obesity (BMI >85th percentile or BMI 25 or higher)  Yes, patient has at least one high risk condition as noted above.     Current COVID symptoms:   - cough  - fatigue  - muscle or body aches  - new loss of taste or smell  - congestion or runny nose  Yes. Patient has at least one symptom as selected.     How many days since symptoms started? 5 days or less. Established patient, 12 years or older weighing at least 88.2 lbs, who has symptoms that started in the past 5 days, has not been hospitalized nor received treatment already, and is at risk for being very sick from COVID-19.     Treatment eligibility by RN:  Are you currently pregnant or nursing? No  Do you have a clinically significant hypersensitivity to nirmatrelvir or ritonavir, or toxic epidermal necrolysis (TEN) or Brown-Iglesia Syndrome? No  Do you have a history of hepatitis, any hepatic impairment on the Problem List (such as Child-Nuñez Class C, cirrhosis, fatty liver disease, alcoholic liver disease), or was the last liver lab (hepatic panel, ALT, AST, ALK Phos, bilirubin) elevated in the past 6 months? No  Do you have any history of severe  renal impairment (eGFR < 30mL/min)? No    Is patient eligible to continue? Yes, patient meets all eligibility requirements for the RN COVID treatment (as denoted by all no responses above).     Current Outpatient Medications   Medication Sig Dispense Refill    albuterol (PROAIR HFA/PROVENTIL HFA/VENTOLIN HFA) 108 (90 Base) MCG/ACT inhaler Inhale 2 puffs into the lungs every 6 hours as needed for shortness of breath Profile Rx: patient will contact pharmacy when needed 18 g 3    azithromycin (ZITHROMAX) 250 MG tablet Take 1 tablets on Mondays, Wednesdays and Fridays 24 tablet 0    Ferrous Sulfate 50 MG TBCR       hydrOXYzine (ATARAX) 25 MG tablet Take 0.5-1 tablets (12.5-25 mg) by mouth 3 times daily as needed for itching 30 tablet 0    loratadine (CLARITIN) 10 MG tablet Take 1 tablet by mouth daily. 90 tablet 3    methylcellulose (CITRUCEL) powder       metroNIDAZOLE (METROGEL) 0.75 % external gel Apply to face BID 45 g 11    Multiple Vitamin (MULTI-VITAMIN) per tablet Take 1 tablet by mouth daily. 90 tablet 3    SYNTHROID 50 MCG tablet       triamcinolone (KENALOG) 0.1 % external cream Apply topically 2 times daily Avoid face and flexure regions. Use no more than 2 week in a row. 30 g 1    vitamin D3 (CHOLECALCIFEROL) 50 mcg (2000 units) tablet          Medications from List 1 of the standing order (on medications that exclude the use of Paxlovid) that patient is taking: NONE. Is patient taking Melania's Wort? No  Is patient taking Melania's Wort or any meds from List 1? No.   Medications from List 2 of the standing order (on meds that provider needs to adjust) that patient is taking: NONE. Is patient on any of the meds from List 2? No.   Medications from List 3 of standing order (on meds that a RN needs to adjust) that patient is taking: NONE. Is patient on any meds from List 3? No.     Paxlovid has an approximate 90% reduction in hospitalization. Paxlovid can possibly cause altered sense of taste, diarrhea  (loose, watery stools), high blood pressure, muscle aches.     Would patient like a Paxlovid prescription?   Yes.   Lab Results   Component Value Date    GFRESTIMATED >90 03/03/2023       Was last eGFR reduced? No, eGFR 60 or greater/ No Result on record. Patient can receive the normal renal function dose. Paxlovid Rx sent to [place] [facility]   [facility] [phone]    [address] [place] [id number]    Hours:   Mon-Fri: 8:00a - 5:00p   Sat: 9:00a - 12:00p    Drive Thru available    Temporary change to home medications: None    All medication adjustments (holds, etc) were discussed with the patient and patient was asked to repeat back (teachback) their med adjustment.  Did patient understand med adjustment? No medication adjustments needed.         Reviewed the following instructions with the patient:    Paxlovid (nimatrelvir and ritonavir)    How it works  Two medicines (nirmatrelvir and ritonavir) are taken together. They stop the virus from growing. Less amount of virus is easier for your body to fight.    How to take  Medicine comes in a daily container with both medicine tablets. Take by mouth twice daily (once in the morning, once at night) for 5 days.  The number of tablets to take varies by patient.  Don't chew or break capsules. Swallow whole.    When to take  Take as soon as possible after positive COVID-19 test result, and within 5 days of your first symptoms.    Possible side effects  Can cause altered sense of taste, diarrhea (loose, watery stools), high blood pressure, muscle aches.    Miya Hernandez RN

## 2024-01-04 NOTE — TELEPHONE ENCOUNTER
Pt calls stating she has tested Positive for COVID today.   Symptoms for 3 days.     Has Chills, better now.   Congested, has cough, body aches, fatigue, sense of taste.   Denies any fevers.     She has history of Asthma.     Will route to COVID team to see if eligible.

## 2024-02-01 ENCOUNTER — OFFICE VISIT (OUTPATIENT)
Dept: DERMATOLOGY | Facility: CLINIC | Age: 44
End: 2024-02-01
Payer: COMMERCIAL

## 2024-02-01 DIAGNOSIS — L20.84 INTRINSIC ATOPIC DERMATITIS: Primary | ICD-10-CM

## 2024-02-01 DIAGNOSIS — L73.9 FOLLICULITIS: ICD-10-CM

## 2024-02-01 DIAGNOSIS — L71.9 ROSACEA: ICD-10-CM

## 2024-02-01 PROCEDURE — 99213 OFFICE O/P EST LOW 20 MIN: CPT | Performed by: DERMATOLOGY

## 2024-02-01 RX ORDER — CLINDAMYCIN AND BENZOYL PEROXIDE 10; 50 MG/G; MG/G
GEL TOPICAL
Qty: 50 G | Refills: 6 | Status: SHIPPED | OUTPATIENT
Start: 2024-02-01

## 2024-02-01 RX ORDER — FLUOCINONIDE 0.5 MG/G
CREAM TOPICAL 2 TIMES DAILY
Qty: 120 G | Refills: 6 | Status: SHIPPED | OUTPATIENT
Start: 2024-02-01

## 2024-02-01 RX ORDER — AMOXICILLIN 500 MG/1
500 CAPSULE ORAL DAILY
Qty: 60 CAPSULE | Refills: 6 | Status: SHIPPED | OUTPATIENT
Start: 2024-02-01

## 2024-02-01 NOTE — LETTER
2024         RE: Pily Vargas   [address]  [facility] [address]        Dear Colleague,    Thank you for referring your patient, Pily Vargas, to the [facility]. Please see a copy of my visit note below.    Pily Vargas , a 43 year old year old female patient, I was asked to see by Sheila Acuna for spot son scalp and rash on arms.  Patient has no other skin complaints today.  Remainder of the HPI, Meds, PMH, Allergies, FH, and SH was reviewed in chart.      Past Medical History:   Diagnosis Date     Abnormal Pap smear     returned to normal after colposcopy     Animal dander allergy      ASCUS with positive high risk HPV 2012    + HPV 66, declined colp     Asthma     as a child     CARDIOVASCULAR SCREENING; LDL GOAL LESS THAN 160      History of blood transfusion     After      Hypertension     During pregnancy     Seasonal allergies     summer       Past Surgical History:   Procedure Laterality Date     ABDOMEN SURGERY  2022         BIOPSY  1997    Lung issues when I was young     COLONOSCOPY  2020    Ulcerative Proctitis     GYN SURGERY  2022         no surgeries          Family History   Problem Relation Age of Onset     Cancer Maternal Grandmother         Pancreatic      Diabetes Maternal Grandmother      Cardiovascular Maternal Grandmother         Few MI's       Social History     Socioeconomic History     Marital status:      Spouse name: Not on file     Number of children: Not on file     Years of education: Not on file     Highest education level: Not on file   Occupational History     Not on file   Tobacco Use     Smoking status: Never     Smokeless tobacco: Never   Substance and Sexual Activity     Alcohol use: Yes     Comment: So very rarely     Drug use: Not Currently     Types: Marijuana     Comment: So very rarely and nothing since well before baby.     Sexual activity: Yes      Partners: Male     Birth control/protection: None   Other Topics Concern     Parent/sibling w/ CABG, MI or angioplasty before 65F 55M? No   Social History Narrative     Not on file     Social Determinants of Health     Financial Resource Strain: Not on file   Food Insecurity: Not on file   Transportation Needs: Not on file   Physical Activity: Not on file   Stress: Not on file   Social Connections: Not on file   Interpersonal Safety: Not on file   Housing Stability: Not on file       Outpatient Encounter Medications as of 2/1/2024   Medication Sig Dispense Refill     albuterol (PROAIR HFA/PROVENTIL HFA/VENTOLIN HFA) 108 (90 Base) MCG/ACT inhaler Inhale 2 puffs into the lungs every 6 hours as needed for shortness of breath Profile Rx: patient will contact pharmacy when needed 18 g 3     Ferrous Sulfate 50 MG TBCR        loratadine (CLARITIN) 10 MG tablet Take 1 tablet by mouth daily. 90 tablet 3     methylcellulose (CITRUCEL) powder        metroNIDAZOLE (METROGEL) 0.75 % external gel Apply to face BID 45 g 11     Multiple Vitamin (MULTI-VITAMIN) per tablet Take 1 tablet by mouth daily. 90 tablet 3     SYNTHROID 50 MCG tablet        triamcinolone (KENALOG) 0.1 % external cream Apply topically 2 times daily Avoid face and flexure regions. Use no more than 2 week in a row. 30 g 1     vitamin D3 (CHOLECALCIFEROL) 50 mcg (2000 units) tablet        azithromycin (ZITHROMAX) 250 MG tablet Take 1 tablets on Mondays, Wednesdays and Fridays 24 tablet 0     hydrOXYzine (ATARAX) 25 MG tablet Take 0.5-1 tablets (12.5-25 mg) by mouth 3 times daily as needed for itching 30 tablet 0     No facility-administered encounter medications on file as of 2/1/2024.             Review Of Systems  Skin: As above  Eyes: negative  Ears/Nose/Throat: negative  Respiratory: No shortness of breath, dyspnea on exertion, cough, or hemoptysis  Cardiovascular: negative  Gastrointestinal: negative  Genitourinary: negative  Musculoskeletal:  negative  Neurologic: negative  Psychiatric: negative  Hematologic/Lymphatic/Immunologic: negative  Endocrine: negative      O:   NAD, WDWN, Alert & Oriented, Mood & Affect wnl, Vitals stable   General appearance bette ii   Vitals stable   Alert, oriented and in no acute distress   Scaly papules on dorsal arms and urticarial papules  Scalp with inflammatory papules       Eyes: Conjunctivae/lids:Normal     ENT: Lips, buccal mucosa, tongue: normal    MSK:Normal    Cardiovascular: peripheral edema none    Pulm: Breathing Normal    Neuro/Psych: Orientation:Normal; Mood/Affect:Normal      A/P:  Rosacea  Cont metrocream  2. Atopic derm   Lidex prn   Skin care and emollient use discussed with patient   Claritin in am  Zyrtec bedtime  3. Folliculitis in scalp  Amox daily  Benzaclin daily  Return to clinic 3 months  It was a pleasure speaking to Pily AGUERO Sessions today.  Previous clinic  notes and pertinent laboratory tests were reviewed prior to Pily Vargas's visit.  Skin care regimen reviewed with patient: Eliminate harsh soaps, i.e. Dial, zest, irsih spring; Mild soaps such as Cetaphil or Dove sensitive skin, avoid hot or cold showers, aggressive use of emollients including vanicream, cetaphil or cerave discussed with patient.        Again, thank you for allowing me to participate in the care of your patient.        Sincerely,        Cooper Mcclelland MD

## 2024-02-01 NOTE — PROGRESS NOTES
Pily AGUERO Sam , a 43 year old year old female patient, I was asked to see by Sheila Acuna for spot son scalp and rash on arms.  Patient has no other skin complaints today.  Remainder of the HPI, Meds, PMH, Allergies, FH, and SH was reviewed in chart.      Past Medical History:   Diagnosis Date    Abnormal Pap smear     returned to normal after colposcopy    Animal dander allergy     ASCUS with positive high risk HPV 2012    + HPV 66, declined colp    Asthma     as a child    CARDIOVASCULAR SCREENING; LDL GOAL LESS THAN 160     History of blood transfusion     After     Hypertension     During pregnancy    Seasonal allergies     summer       Past Surgical History:   Procedure Laterality Date    ABDOMEN SURGERY  2022        BIOPSY  1997    Lung issues when I was young    COLONOSCOPY  2020    Ulcerative Proctitis    GYN SURGERY  2022        no surgeries          Family History   Problem Relation Age of Onset    Cancer Maternal Grandmother         Pancreatic     Diabetes Maternal Grandmother     Cardiovascular Maternal Grandmother         Few MI's       Social History     Socioeconomic History    Marital status:      Spouse name: Not on file    Number of children: Not on file    Years of education: Not on file    Highest education level: Not on file   Occupational History    Not on file   Tobacco Use    Smoking status: Never    Smokeless tobacco: Never   Substance and Sexual Activity    Alcohol use: Yes     Comment: So very rarely    Drug use: Not Currently     Types: Marijuana     Comment: So very rarely and nothing since well before baby.    Sexual activity: Yes     Partners: Male     Birth control/protection: None   Other Topics Concern    Parent/sibling w/ CABG, MI or angioplasty before 65F 55M? No   Social History Narrative    Not on file     Social Determinants of Health     Financial Resource Strain: Not on file   Food Insecurity: Not on file    Transportation Needs: Not on file   Physical Activity: Not on file   Stress: Not on file   Social Connections: Not on file   Interpersonal Safety: Not on file   Housing Stability: Not on file       Outpatient Encounter Medications as of 2/1/2024   Medication Sig Dispense Refill    albuterol (PROAIR HFA/PROVENTIL HFA/VENTOLIN HFA) 108 (90 Base) MCG/ACT inhaler Inhale 2 puffs into the lungs every 6 hours as needed for shortness of breath Profile Rx: patient will contact pharmacy when needed 18 g 3    Ferrous Sulfate 50 MG TBCR       loratadine (CLARITIN) 10 MG tablet Take 1 tablet by mouth daily. 90 tablet 3    methylcellulose (CITRUCEL) powder       metroNIDAZOLE (METROGEL) 0.75 % external gel Apply to face BID 45 g 11    Multiple Vitamin (MULTI-VITAMIN) per tablet Take 1 tablet by mouth daily. 90 tablet 3    SYNTHROID 50 MCG tablet       triamcinolone (KENALOG) 0.1 % external cream Apply topically 2 times daily Avoid face and flexure regions. Use no more than 2 week in a row. 30 g 1    vitamin D3 (CHOLECALCIFEROL) 50 mcg (2000 units) tablet       azithromycin (ZITHROMAX) 250 MG tablet Take 1 tablets on Mondays, Wednesdays and Fridays 24 tablet 0    hydrOXYzine (ATARAX) 25 MG tablet Take 0.5-1 tablets (12.5-25 mg) by mouth 3 times daily as needed for itching 30 tablet 0     No facility-administered encounter medications on file as of 2/1/2024.             Review Of Systems  Skin: As above  Eyes: negative  Ears/Nose/Throat: negative  Respiratory: No shortness of breath, dyspnea on exertion, cough, or hemoptysis  Cardiovascular: negative  Gastrointestinal: negative  Genitourinary: negative  Musculoskeletal: negative  Neurologic: negative  Psychiatric: negative  Hematologic/Lymphatic/Immunologic: negative  Endocrine: negative      O:   NAD, WDWN, Alert & Oriented, Mood & Affect wnl, Vitals stable   General appearance bette ii   Vitals stable   Alert, oriented and in no acute distress   Scaly papules on dorsal arms and  urticarial papules  Scalp with inflammatory papules       Eyes: Conjunctivae/lids:Normal     ENT: Lips, buccal mucosa, tongue: normal    MSK:Normal    Cardiovascular: peripheral edema none    Pulm: Breathing Normal    Neuro/Psych: Orientation:Normal; Mood/Affect:Normal      A/P:  Rosacea  Cont metrocream  2. Atopic derm   Lidex prn   Skin care and emollient use discussed with patient   Claritin in am  Zyrtec bedtime  3. Folliculitis in scalp  Amox daily  Benzaclin daily  Return to clinic 3 months  It was a pleasure speaking to Pily AGUERO Sessions today.  Previous clinic  notes and pertinent laboratory tests were reviewed prior to Pily Vargas's visit.  Skin care regimen reviewed with patient: Eliminate harsh soaps, i.e. Dial, zest, irsih spring; Mild soaps such as Cetaphil or Dove sensitive skin, avoid hot or cold showers, aggressive use of emollients including vanicream, cetaphil or cerave discussed with patient.

## 2024-02-20 ENCOUNTER — TELEPHONE (OUTPATIENT)
Dept: DERMATOLOGY | Facility: CLINIC | Age: 44
End: 2024-02-20
Payer: COMMERCIAL

## 2024-02-20 NOTE — TELEPHONE ENCOUNTER
Called and spoke with pt and discussed prescriptions and rescheduled appt    Yana GRESHAM RN  Dermatology   [phone]

## 2024-02-20 NOTE — TELEPHONE ENCOUNTER
[facility] Call Center    Phone Message    May a detailed message be left on voicemail: yes     Reason for Call: Stas - Patient finally got all her meds now and wants to go over use for all - also wants to move her 3 month follow up- writer unable to schedule until 09/19 -  please call back [phone] Thank you    Action Taken: Other: OX DERM    Travel Screening: Not Applicable

## 2024-03-01 ENCOUNTER — OFFICE VISIT (OUTPATIENT)
Dept: ALLERGY | Facility: CLINIC | Age: 44
End: 2024-03-01
Payer: COMMERCIAL

## 2024-03-01 ENCOUNTER — LAB (OUTPATIENT)
Dept: LAB | Facility: CLINIC | Age: 44
End: 2024-03-01
Payer: COMMERCIAL

## 2024-03-01 VITALS
HEART RATE: 60 BPM | SYSTOLIC BLOOD PRESSURE: 100 MMHG | BODY MASS INDEX: 31.51 KG/M2 | OXYGEN SATURATION: 96 % | DIASTOLIC BLOOD PRESSURE: 68 MMHG | WEIGHT: 169.5 LBS

## 2024-03-01 DIAGNOSIS — J30.81 ANIMAL DANDER ALLERGY: ICD-10-CM

## 2024-03-01 DIAGNOSIS — J30.2 SEASONAL ALLERGIES: ICD-10-CM

## 2024-03-01 DIAGNOSIS — J45.20 MILD INTERMITTENT ASTHMA WITHOUT COMPLICATION: Primary | ICD-10-CM

## 2024-03-01 PROCEDURE — 86003 ALLG SPEC IGE CRUDE XTRC EA: CPT

## 2024-03-01 PROCEDURE — 99203 OFFICE O/P NEW LOW 30 MIN: CPT | Performed by: INTERNAL MEDICINE

## 2024-03-01 PROCEDURE — 36415 COLL VENOUS BLD VENIPUNCTURE: CPT

## 2024-03-01 ASSESSMENT — ASTHMA QUESTIONNAIRES
QUESTION_2 LAST FOUR WEEKS HOW OFTEN HAVE YOU HAD SHORTNESS OF BREATH: NOT AT ALL
QUESTION_5 LAST FOUR WEEKS HOW WOULD YOU RATE YOUR ASTHMA CONTROL: COMPLETELY CONTROLLED
QUESTION_4 LAST FOUR WEEKS HOW OFTEN HAVE YOU USED YOUR RESCUE INHALER OR NEBULIZER MEDICATION (SUCH AS ALBUTEROL): NOT AT ALL
QUESTION_3 LAST FOUR WEEKS HOW OFTEN DID YOUR ASTHMA SYMPTOMS (WHEEZING, COUGHING, SHORTNESS OF BREATH, CHEST TIGHTNESS OR PAIN) WAKE YOU UP AT NIGHT OR EARLIER THAN USUAL IN THE MORNING: NOT AT ALL
ACT_TOTALSCORE: 25
QUESTION_1 LAST FOUR WEEKS HOW MUCH OF THE TIME DID YOUR ASTHMA KEEP YOU FROM GETTING AS MUCH DONE AT WORK, SCHOOL OR AT HOME: NONE OF THE TIME
ACT_TOTALSCORE: 25

## 2024-03-01 ASSESSMENT — PAIN SCALES - GENERAL: PAINLEVEL: NO PAIN (0)

## 2024-03-01 NOTE — PROGRESS NOTES
Pily Vargas was seen in the Allergy Clinic at [facility].    Pily Vargas is a 43 year old female being seen today at the request of Sheila Acuna APRN CNP/[facility] in consultation for Seasonal allergies/Animal dander allergy.    Pily has a history of allergies and did receive allergy shots in the past.  However it sounds like she only had allergy shots for 6 to 12 months.  Her allergy symptoms have worsened after having a child a couple years ago.  She describes itching of her ears and throat as well as nasal congestion and itchy eyes.  In the winter symptoms are decreased compared to spring and fall.    She also has a history of asthma which is mainly exercise-induced.  She takes albuterol as needed.    For her allergies she uses Claritin daily for the last 8 years.  Zyrtec was added a few weeks ago by dermatology to help with her skin itching and overall skin condition.    She rarely uses Flonase or eyedrops.    She is interested in allergy shots.  However she does live a distance away from this location.      Past Medical History:   Diagnosis Date    Abnormal Pap smear     returned to normal after colposcopy    Animal dander allergy     ASCUS with positive high risk HPV 2012    + HPV 66, declined colp    Asthma     as a child    CARDIOVASCULAR SCREENING; LDL GOAL LESS THAN 160     History of blood transfusion     After     Hypertension     During pregnancy    Seasonal allergies     summer     Family History   Problem Relation Age of Onset    Cancer Maternal Grandmother         Pancreatic     Diabetes Maternal Grandmother     Cardiovascular Maternal Grandmother         Few MI's     Past Surgical History:   Procedure Laterality Date    ABDOMEN SURGERY  2022        BIOPSY  1997    Lung issues when I was young    COLONOSCOPY  2020    Ulcerative Proctitis    GYN SURGERY  2022        no surgeries          ENVIRONMENTAL HISTORY:   Pets inside the house include 2 dog(s).  Do you smoke cigarettes or other recreational drugs? No There is/are 0 smokers living in the house. The house do not have a damp basement.     SOCIAL HISTORY:   Pily is employed as . She lives with her spouse, daughter, and sister/brother in law, nephew.      Review of Systems      Current Outpatient Medications:     albuterol (PROAIR HFA/PROVENTIL HFA/VENTOLIN HFA) 108 (90 Base) MCG/ACT inhaler, Inhale 2 puffs into the lungs every 6 hours as needed for shortness of breath Profile Rx: patient will contact pharmacy when needed, Disp: 18 g, Rfl: 3    amoxicillin (AMOXIL) 500 MG capsule, Take 1 capsule (500 mg) by mouth daily, Disp: 60 capsule, Rfl: 6    Cetirizine HCl (ZYRTEC ALLERGY PO), Take 10 mg by mouth at bedtime, Disp: , Rfl:     clindamycin-benzoyl peroxide (BENZACLIN) 1-5 % external gel, To scalp, Disp: 50 g, Rfl: 6    Ferrous Sulfate 50 MG TBCR, , Disp: , Rfl:     fluocinonide (LIDEX) 0.05 % external cream, Apply topically 2 times daily, Disp: 120 g, Rfl: 6    loratadine (CLARITIN) 10 MG tablet, Take 1 tablet by mouth daily., Disp: 90 tablet, Rfl: 3    methylcellulose (CITRUCEL) powder, , Disp: , Rfl:     metroNIDAZOLE (METROGEL) 0.75 % external gel, Apply to face BID, Disp: 45 g, Rfl: 11    Multiple Vitamin (MULTI-VITAMIN) per tablet, Take 1 tablet by mouth daily., Disp: 90 tablet, Rfl: 3    SYNTHROID 50 MCG tablet, , Disp: , Rfl:     triamcinolone (KENALOG) 0.1 % external cream, Apply topically 2 times daily Avoid face and flexure regions. Use no more than 2 week in a row., Disp: 30 g, Rfl: 1    vitamin D3 (CHOLECALCIFEROL) 50 mcg (2000 units) tablet, , Disp: , Rfl:     azithromycin (ZITHROMAX) 250 MG tablet, Take 1 tablets on Mondays, Wednesdays and Fridays, Disp: 24 tablet, Rfl: 0    hydrOXYzine (ATARAX) 25 MG tablet, Take 0.5-1 tablets (12.5-25 mg) by mouth 3 times daily as needed for itching, Disp: 30 tablet,  Rfl: 0  Allergies   Allergen Reactions    Dust Mites Other (See Comments)    Minocycline Hives     Pt thinks it was this one, it was prescribed by her dermatologist.          EXAM:   /68   Pulse 60   Wt 76.9 kg (169 lb 8 oz)   SpO2 96%   BMI 31.51 kg/m      Physical Exam    Constitutional:       General: She is not in acute distress.     Appearance: Normal appearance. She is not ill-appearing.   HENT:      Head: Normocephalic and atraumatic.      Nose: Nose normal. No congestion or rhinorrhea.      Mouth/Throat:      Mouth: Mucous membranes are moist.      Pharynx: Oropharynx is clear. No posterior oropharyngeal erythema.   Eyes:      General:         Right eye: No discharge.         Left eye: No discharge.   Cardiovascular:      Rate and Rhythm: Normal rate and regular rhythm.      Heart sounds: Normal heart sounds.   Pulmonary:      Effort: Pulmonary effort is normal.      Breath sounds: Normal breath sounds. No wheezing or rhonchi.   Skin:     General: Skin is warm.      Findings: No erythema or rash.   Neurological:      General: No focal deficit present.      Mental Status: She is alert. Mental status is at baseline.   Psychiatric:         Mood and Affect: Mood normal.         Behavior: Behavior normal.        ASSESSMENT/PLAN:  Pily Vargas is a 43 year old female seen today for allergy and asthma concerns.  Her asthma is quite stable and it is really the allergy symptoms that are been bothersome.  She has some interest in allergy shots.    Continue with Claritin daily while Zyrtec daily per dermatology.  Will order blood testing.  May consider allergy immunotherapy.  This was discussed in some detail today.  She does live a distance away from this location so cluster immunotherapy may be an option.  Recommended Pataday eyedrops as needed.  May use Flonase as needed.  Continue albuterol as needed    Follow-up in 1 to 2 months      Thank you for allowing me to participate in the care of Pily AGUERO  Sessions.      I spent 35 minutes on the date of the encounter doing chart review, history and exam, documentation and further coordination as noted above exclusive of separately reported interpretations    Yamil Lomas MD  Allergy/Immunology  [facility]

## 2024-03-01 NOTE — PATIENT INSTRUCTIONS
Cluster immunotherapy        Allergy Staff Appt Hours Shot Hours Location       Physician   Yamil Lomas MD      Support Staff   HOLLI Jacques MA Emily J., MA      Mondays Tuesdays Thursdays and Fridays:      Demetria 7-5      Wednesdays         Close                Mondays, Tuesdays and Fridays:  7:20 - 3:40              [facility]  [address]  [place] [address]  Allergy appointment  line: [phone]    Pulmonary Function Scheduling:  [place]: [phone]           Questions about cost of your care  For questions about your cost of your visit, procedure, lab or imaging contact: [employer] Price Line [phone] or visit:  [email or web address]    Prescription Assistance  If you need assistance with your prescriptions (cost, coverage, etc) please contact: [employer] Prescription Assistance Program [phone]    Important Scheduling Information  All visits for food challenges, medication/drug allergy testing, and drug challenges MUST be scheduled through the allergy clinic nurse. Please contact them via [employer] or by calling the clinic at [phone] and asking to speak with an allergy nurse. They will provide additional information and instructions for the appointment. Discontinue oral antihistamines 7 days prior to the appointment. Discontinue nasal and ocular antihistamines 1 day prior to the appointment.    Appointments for skin testing: Appointment will last approximately 45 minutes.  Please call the appointment line for your clinic to schedule.  Discontinue oral antihistamines 7 days prior to the appointment.  Discontinue nasal and ocular antihistamines 1 days prior to appointment.    Thank you for trusting us with your care. Please feel free to contact us with any questions or concerns you may have.

## 2024-03-01 NOTE — LETTER
3/1/2024         RE: Pily Vargas   [address]  [facility] [address]        Dear Colleague,    Thank you for referring your patient, Pily Vargas, to the [facility]. Please see a copy of my visit note below.    Pily Vargas was seen in the Allergy Clinic at [facility].    Pily Vargas is a 43 year old female being seen today at the request of Sheila Acuna APRN CNP/[facility] in consultation for Seasonal allergies/Animal dander allergy.    Pily has a history of allergies and did receive allergy shots in the past.  However it sounds like she only had allergy shots for 6 to 12 months.  Her allergy symptoms have worsened after having a child a couple years ago.  She describes itching of her ears and throat as well as nasal congestion and itchy eyes.  In the winter symptoms are decreased compared to spring and fall.    She also has a history of asthma which is mainly exercise-induced.  She takes albuterol as needed.    For her allergies she uses Claritin daily for the last 8 years.  Zyrtec was added a few weeks ago by dermatology to help with her skin itching and overall skin condition.    She rarely uses Flonase or eyedrops.    She is interested in allergy shots.  However she does live a distance away from this location.      Past Medical History:   Diagnosis Date     Abnormal Pap smear     returned to normal after colposcopy     Animal dander allergy      ASCUS with positive high risk HPV 2012    + HPV 66, declined colp     Asthma     as a child     CARDIOVASCULAR SCREENING; LDL GOAL LESS THAN 160      History of blood transfusion     After      Hypertension     During pregnancy     Seasonal allergies     summer     Family History   Problem Relation Age of Onset     Cancer Maternal Grandmother         Pancreatic      Diabetes Maternal Grandmother      Cardiovascular Maternal Grandmother         Few  MI's     Past Surgical History:   Procedure Laterality Date     ABDOMEN SURGERY  2022         BIOPSY  1997    Lung issues when I was young     COLONOSCOPY  2020    Ulcerative Proctitis     GYN SURGERY  2022         no surgeries         ENVIRONMENTAL HISTORY:   Pets inside the house include 2 dog(s).  Do you smoke cigarettes or other recreational drugs? No There is/are 0 smokers living in the house. The house do not have a damp basement.     SOCIAL HISTORY:   Pily is employed as . She lives with her spouse, daughter, and sister/brother in law, nephew.      Review of Systems      Current Outpatient Medications:      albuterol (PROAIR HFA/PROVENTIL HFA/VENTOLIN HFA) 108 (90 Base) MCG/ACT inhaler, Inhale 2 puffs into the lungs every 6 hours as needed for shortness of breath Profile Rx: patient will contact pharmacy when needed, Disp: 18 g, Rfl: 3     amoxicillin (AMOXIL) 500 MG capsule, Take 1 capsule (500 mg) by mouth daily, Disp: 60 capsule, Rfl: 6     Cetirizine HCl (ZYRTEC ALLERGY PO), Take 10 mg by mouth at bedtime, Disp: , Rfl:      clindamycin-benzoyl peroxide (BENZACLIN) 1-5 % external gel, To scalp, Disp: 50 g, Rfl: 6     Ferrous Sulfate 50 MG TBCR, , Disp: , Rfl:      fluocinonide (LIDEX) 0.05 % external cream, Apply topically 2 times daily, Disp: 120 g, Rfl: 6     loratadine (CLARITIN) 10 MG tablet, Take 1 tablet by mouth daily., Disp: 90 tablet, Rfl: 3     methylcellulose (CITRUCEL) powder, , Disp: , Rfl:      metroNIDAZOLE (METROGEL) 0.75 % external gel, Apply to face BID, Disp: 45 g, Rfl: 11     Multiple Vitamin (MULTI-VITAMIN) per tablet, Take 1 tablet by mouth daily., Disp: 90 tablet, Rfl: 3     SYNTHROID 50 MCG tablet, , Disp: , Rfl:      triamcinolone (KENALOG) 0.1 % external cream, Apply topically 2 times daily Avoid face and flexure regions. Use no more than 2 week in a row., Disp: 30 g, Rfl: 1     vitamin D3 (CHOLECALCIFEROL) 50 mcg (  units) tablet, , Disp: , Rfl:      azithromycin (ZITHROMAX) 250 MG tablet, Take 1 tablets on Mondays, Wednesdays and Fridays, Disp: 24 tablet, Rfl: 0     hydrOXYzine (ATARAX) 25 MG tablet, Take 0.5-1 tablets (12.5-25 mg) by mouth 3 times daily as needed for itching, Disp: 30 tablet, Rfl: 0  Allergies   Allergen Reactions     Dust Mites Other (See Comments)     Minocycline Hives     Pt thinks it was this one, it was prescribed by her dermatologist.          EXAM:   /68   Pulse 60   Wt 76.9 kg (169 lb 8 oz)   SpO2 96%   BMI 31.51 kg/m      Physical Exam    Constitutional:       General: She is not in acute distress.     Appearance: Normal appearance. She is not ill-appearing.   HENT:      Head: Normocephalic and atraumatic.      Nose: Nose normal. No congestion or rhinorrhea.      Mouth/Throat:      Mouth: Mucous membranes are moist.      Pharynx: Oropharynx is clear. No posterior oropharyngeal erythema.   Eyes:      General:         Right eye: No discharge.         Left eye: No discharge.   Cardiovascular:      Rate and Rhythm: Normal rate and regular rhythm.      Heart sounds: Normal heart sounds.   Pulmonary:      Effort: Pulmonary effort is normal.      Breath sounds: Normal breath sounds. No wheezing or rhonchi.   Skin:     General: Skin is warm.      Findings: No erythema or rash.   Neurological:      General: No focal deficit present.      Mental Status: She is alert. Mental status is at baseline.   Psychiatric:         Mood and Affect: Mood normal.         Behavior: Behavior normal.        ASSESSMENT/PLAN:  Pily Vargas is a 43 year old female seen today for allergy and asthma concerns.  Her asthma is quite stable and it is really the allergy symptoms that are been bothersome.  She has some interest in allergy shots.    Continue with Claritin daily while Zyrtec daily per dermatology.  Will order blood testing.  May consider allergy immunotherapy.  This was discussed in some detail today.  She does  live a distance away from this location so cluster immunotherapy may be an option.  Recommended Pataday eyedrops as needed.  May use Flonase as needed.  Continue albuterol as needed    Follow-up in 1 to 2 months      Thank you for allowing me to participate in the care of Pily Vargas.      I spent 35 minutes on the date of the encounter doing chart review, history and exam, documentation and further coordination as noted above exclusive of separately reported interpretations    Yamil Lomas MD  Allergy/Immunology  [facility]      Again, thank you for allowing me to participate in the care of your patient.        Sincerely,        Yamil Lomas MD

## 2024-03-04 LAB
A ALTERNATA IGE QN: 4.03 KU(A)/L
A FUMIGATUS IGE QN: 0.37 KU(A)/L
C HERBARUM IGE QN: 0.84 KU(A)/L
CALIF WALNUT POLN IGE QN: <0.1 KU(A)/L
CAT DANDER IGG QN: 4.1 KU(A)/L
CEDAR IGE QN: <0.1 KU(A)/L
COMMON RAGWEED IGE QN: 0.68 KU(A)/L
COTTONWOOD IGE QN: <0.1 KU(A)/L
D FARINAE IGE QN: 0.22 KU(A)/L
D PTERONYSS IGE QN: 0.25 KU(A)/L
DOG DANDER+EPITH IGE QN: 5.8 KU(A)/L
E PURPURASCENS IGE QN: 1.79 KU(A)/L
EAST WHITE PINE IGE QN: <0.1 KU(A)/L
ENGL PLANTAIN IGE QN: <0.1 KU(A)/L
FIREBUSH IGE QN: <0.1 KU(A)/L
GIANT RAGWEED IGE QN: 0.24 KU(A)/L
GOOSEFOOT IGE QN: <0.1 KU(A)/L
JOHNSON GRASS IGE QN: <0.1 KU(A)/L
MAPLE IGE QN: <0.1 KU(A)/L
MUGWORT IGE QN: <0.1 KU(A)/L
NETTLE IGE QN: <0.1 KU(A)/L
P NOTATUM IGE QN: 0.18 KU(A)/L
RED MULBERRY IGE QN: <0.1 KU(A)/L
SALTWORT IGE QN: <0.1 KU(A)/L
SHEEP SORREL IGE QN: <0.1 KU(A)/L
SILVER BIRCH IGE QN: <0.1 KU(A)/L
TIMOTHY IGE QN: 0.16 KU(A)/L
WHITE ASH IGE QN: 0.12 KU(A)/L
WHITE ELM IGE QN: <0.1 KU(A)/L
WHITE MULBERRY IGE QN: <0.1 KU(A)/L
WHITE OAK IGE QN: <0.1 KU(A)/L
WORMWOOD IGE QN: 0.17 KU(A)/L

## 2024-03-22 ENCOUNTER — OFFICE VISIT (OUTPATIENT)
Dept: ALLERGY | Facility: CLINIC | Age: 44
End: 2024-03-22
Attending: INTERNAL MEDICINE
Payer: COMMERCIAL

## 2024-03-22 VITALS
WEIGHT: 165 LBS | OXYGEN SATURATION: 98 % | SYSTOLIC BLOOD PRESSURE: 106 MMHG | HEART RATE: 74 BPM | BODY MASS INDEX: 30.67 KG/M2 | DIASTOLIC BLOOD PRESSURE: 71 MMHG

## 2024-03-22 DIAGNOSIS — J30.2 SEASONAL ALLERGIES: ICD-10-CM

## 2024-03-22 DIAGNOSIS — J30.81 ANIMAL DANDER ALLERGY: ICD-10-CM

## 2024-03-22 PROCEDURE — 99213 OFFICE O/P EST LOW 20 MIN: CPT | Performed by: INTERNAL MEDICINE

## 2024-03-22 RX ORDER — SEMAGLUTIDE 0.25 MG/.5ML
INJECTION, SOLUTION SUBCUTANEOUS
COMMUNITY
Start: 2024-03-13 | End: 2024-07-16

## 2024-03-22 ASSESSMENT — PAIN SCALES - GENERAL: PAINLEVEL: NO PAIN (0)

## 2024-03-22 NOTE — PROGRESS NOTES
Pily Vargas was seen in the Allergy Clinic at [facility].    Pily Vargas is a 43 year old female being seen today for ongoing evaluation of allergic rhinitis.    Since the last visit the patient has been doing better.  With the combination of Zyrtec at night and Claritin in the morning she has significant improvement.  She has questions in regards to allergy immunotherapy.  Blood testing was positive to mold, cat, dog, dust mites, grass, weeds and trees.  Most significant can to the cat, dog and molds.    PAST ALLERGY HISTORY:    Pily has a history of allergies and did receive allergy shots in the past for 6 to 12 months.  Her allergy symptoms have worsened after having a child a couple years ago.  She describes itching of her ears and throat as well as nasal congestion and itchy eyes.  In the winter symptoms are decreased compared to spring and fall.    She also has a history of asthma which is mainly exercise-induced.  She takes albuterol as needed.    She rarely uses Flonase or eyedrops.      Past Medical History:   Diagnosis Date    Abnormal Pap smear     returned to normal after colposcopy    Animal dander allergy     ASCUS with positive high risk HPV 2012    + HPV 66, declined colp    Asthma     as a child    CARDIOVASCULAR SCREENING; LDL GOAL LESS THAN 160     History of blood transfusion     After     Hypertension     During pregnancy    Seasonal allergies     summer     Family History   Problem Relation Age of Onset    Cancer Maternal Grandmother         Pancreatic     Diabetes Maternal Grandmother     Cardiovascular Maternal Grandmother         Few MI's     Past Surgical History:   Procedure Laterality Date    ABDOMEN SURGERY  2022        BIOPSY  1997    Lung issues when I was young    COLONOSCOPY  2020    Ulcerative Proctitis    GYN SURGERY  2022        no surgeries           Current Outpatient Medications:     albuterol  (PROAIR HFA/PROVENTIL HFA/VENTOLIN HFA) 108 (90 Base) MCG/ACT inhaler, Inhale 2 puffs into the lungs every 6 hours as needed for shortness of breath Profile Rx: patient will contact pharmacy when needed, Disp: 18 g, Rfl: 3    amoxicillin (AMOXIL) 500 MG capsule, Take 1 capsule (500 mg) by mouth daily, Disp: 60 capsule, Rfl: 6    Cetirizine HCl (ZYRTEC ALLERGY PO), Take 10 mg by mouth at bedtime, Disp: , Rfl:     clindamycin-benzoyl peroxide (BENZACLIN) 1-5 % external gel, To scalp, Disp: 50 g, Rfl: 6    Ferrous Sulfate 50 MG TBCR, , Disp: , Rfl:     fluocinonide (LIDEX) 0.05 % external cream, Apply topically 2 times daily, Disp: 120 g, Rfl: 6    loratadine (CLARITIN) 10 MG tablet, Take 1 tablet by mouth daily., Disp: 90 tablet, Rfl: 3    methylcellulose (CITRUCEL) powder, , Disp: , Rfl:     metroNIDAZOLE (METROGEL) 0.75 % external gel, Apply to face BID, Disp: 45 g, Rfl: 11    Multiple Vitamin (MULTI-VITAMIN) per tablet, Take 1 tablet by mouth daily., Disp: 90 tablet, Rfl: 3    SYNTHROID 50 MCG tablet, , Disp: , Rfl:     triamcinolone (KENALOG) 0.1 % external cream, Apply topically 2 times daily Avoid face and flexure regions. Use no more than 2 week in a row., Disp: 30 g, Rfl: 1    vitamin D3 (CHOLECALCIFEROL) 50 mcg (2000 units) tablet, , Disp: , Rfl:     WEGOVY 0.25 MG/0.5ML pen, , Disp: , Rfl:   Allergies   Allergen Reactions    Dust Mites Other (See Comments)    Minocycline Hives     Pt thinks it was this one, it was prescribed by her dermatologist.          EXAM:   /71   Pulse 74   Wt 74.8 kg (165 lb)   SpO2 98%   BMI 30.67 kg/m      Constitutional:       General: She is not in acute distress.     Appearance: Normal appearance. She is not ill-appearing.   HENT:      Head: Normocephalic and atraumatic.     Psychiatric:         Mood and Affect: Mood normal.         Behavior: Behavior normal.        ASSESSMENT/PLAN:  Pily Vargas is a 43 year old female seen today for evaluation of seasonal  allergies.  Allergy shots were discussed in detail.  At this point since Claritin and Zyrtec, are providing significant benefit and the distance for her to come here for allergy shots, I think it would be reasonable to continue with oral medical therapy and postpone the decision for allergy shots.  She is in agreement.  She will let us know in the future if symptoms increase or she prefers to start allergy shots.    Thank you for allowing me to participate in the care of Pily AGUERO Sam.      I spent 25 minutes on the date of the encounter doing chart review, history and exam, documentation and further coordination as noted above exclusive of separately reported interpretations    Yamil Lomas MD  Allergy/Immunology  [facility]

## 2024-03-22 NOTE — LETTER
3/22/2024         RE: Pily Vargas   [address]  [facility] [place] [address]        Dear Colleague,    Thank you for referring your patient, Pily Vargas, to the [facility] [place]. Please see a copy of my visit note below.    Pily Vargas was seen in the Allergy Clinic at [facility].    Pily Vargas is a 43 year old female being seen today for ongoing evaluation of allergic rhinitis.    Since the last visit the patient has been doing better.  With the combination of Zyrtec at night and Claritin in the morning she has significant improvement.  She has questions in regards to allergy immunotherapy.  Blood testing was positive to mold, cat, dog, dust mites, grass, weeds and trees.  Most significant can to the cat, dog and molds.    PAST ALLERGY HISTORY:    Pily has a history of allergies and did receive allergy shots in the past for 6 to 12 months.  Her allergy symptoms have worsened after having a child a couple years ago.  She describes itching of her ears and throat as well as nasal congestion and itchy eyes.  In the winter symptoms are decreased compared to spring and fall.    She also has a history of asthma which is mainly exercise-induced.  She takes albuterol as needed.    She rarely uses Flonase or eyedrops.      Past Medical History:   Diagnosis Date     Abnormal Pap smear     returned to normal after colposcopy     Animal dander allergy      ASCUS with positive high risk HPV 2012    + HPV 66, declined colp     Asthma     as a child     CARDIOVASCULAR SCREENING; LDL GOAL LESS THAN 160      History of blood transfusion     After      Hypertension     During pregnancy     Seasonal allergies     summer     Family History   Problem Relation Age of Onset     Cancer Maternal Grandmother         Pancreatic      Diabetes Maternal Grandmother      Cardiovascular Maternal Grandmother         Few MI's     Past Surgical History:   Procedure  Laterality Date     ABDOMEN SURGERY  2022         BIOPSY  1997    Lung issues when I was young     COLONOSCOPY  2020    Ulcerative Proctitis     GYN SURGERY  2022         no surgeries           Current Outpatient Medications:      albuterol (PROAIR HFA/PROVENTIL HFA/VENTOLIN HFA) 108 (90 Base) MCG/ACT inhaler, Inhale 2 puffs into the lungs every 6 hours as needed for shortness of breath Profile Rx: patient will contact pharmacy when needed, Disp: 18 g, Rfl: 3     amoxicillin (AMOXIL) 500 MG capsule, Take 1 capsule (500 mg) by mouth daily, Disp: 60 capsule, Rfl: 6     Cetirizine HCl (ZYRTEC ALLERGY PO), Take 10 mg by mouth at bedtime, Disp: , Rfl:      clindamycin-benzoyl peroxide (BENZACLIN) 1-5 % external gel, To scalp, Disp: 50 g, Rfl: 6     Ferrous Sulfate 50 MG TBCR, , Disp: , Rfl:      fluocinonide (LIDEX) 0.05 % external cream, Apply topically 2 times daily, Disp: 120 g, Rfl: 6     loratadine (CLARITIN) 10 MG tablet, Take 1 tablet by mouth daily., Disp: 90 tablet, Rfl: 3     methylcellulose (CITRUCEL) powder, , Disp: , Rfl:      metroNIDAZOLE (METROGEL) 0.75 % external gel, Apply to face BID, Disp: 45 g, Rfl: 11     Multiple Vitamin (MULTI-VITAMIN) per tablet, Take 1 tablet by mouth daily., Disp: 90 tablet, Rfl: 3     SYNTHROID 50 MCG tablet, , Disp: , Rfl:      triamcinolone (KENALOG) 0.1 % external cream, Apply topically 2 times daily Avoid face and flexure regions. Use no more than 2 week in a row., Disp: 30 g, Rfl: 1     vitamin D3 (CHOLECALCIFEROL) 50 mcg (2000 units) tablet, , Disp: , Rfl:      WEGOVY 0.25 MG/0.5ML pen, , Disp: , Rfl:   Allergies   Allergen Reactions     Dust Mites Other (See Comments)     Minocycline Hives     Pt thinks it was this one, it was prescribed by her dermatologist.          EXAM:   /71   Pulse 74   Wt 74.8 kg (165 lb)   SpO2 98%   BMI 30.67 kg/m      Constitutional:       General: She is not in acute distress.      Appearance: Normal appearance. She is not ill-appearing.   HENT:      Head: Normocephalic and atraumatic.     Psychiatric:         Mood and Affect: Mood normal.         Behavior: Behavior normal.        ASSESSMENT/PLAN:  Pily Vargas is a 43 year old female seen today for evaluation of seasonal allergies.  Allergy shots were discussed in detail.  At this point since Claritin and Zyrtec, are providing significant benefit and the distance for her to come here for allergy shots, I think it would be reasonable to continue with oral medical therapy and postpone the decision for allergy shots.  She is in agreement.  She will let us know in the future if symptoms increase or she prefers to start allergy shots.    Thank you for allowing me to participate in the care of Pily Vargas.      I spent 25 minutes on the date of the encounter doing chart review, history and exam, documentation and further coordination as noted above exclusive of separately reported interpretations    Yamil Lomas MD  Allergy/Immunology  [facility]      Again, thank you for allowing me to participate in the care of your patient.        Sincerely,        Yamil Lomas MD

## 2024-04-03 ENCOUNTER — OFFICE VISIT (OUTPATIENT)
Dept: FAMILY MEDICINE | Facility: CLINIC | Age: 44
End: 2024-04-03
Payer: COMMERCIAL

## 2024-04-03 VITALS
TEMPERATURE: 97.3 F | OXYGEN SATURATION: 97 % | WEIGHT: 166 LBS | HEIGHT: 62 IN | DIASTOLIC BLOOD PRESSURE: 62 MMHG | HEART RATE: 82 BPM | SYSTOLIC BLOOD PRESSURE: 102 MMHG | BODY MASS INDEX: 30.55 KG/M2 | RESPIRATION RATE: 11 BRPM

## 2024-04-03 DIAGNOSIS — Z12.31 ENCOUNTER FOR SCREENING MAMMOGRAM FOR BREAST CANCER: ICD-10-CM

## 2024-04-03 DIAGNOSIS — J45.20 MILD INTERMITTENT ASTHMA, UNSPECIFIED WHETHER COMPLICATED: ICD-10-CM

## 2024-04-03 DIAGNOSIS — Z00.00 ROUTINE GENERAL MEDICAL EXAMINATION AT A HEALTH CARE FACILITY: Primary | ICD-10-CM

## 2024-04-03 DIAGNOSIS — E66.9 OBESITY (BMI 30-39.9): ICD-10-CM

## 2024-04-03 DIAGNOSIS — E03.9 HYPOTHYROIDISM, UNSPECIFIED TYPE: ICD-10-CM

## 2024-04-03 DIAGNOSIS — Z13.0 SCREENING FOR DEFICIENCY ANEMIA: ICD-10-CM

## 2024-04-03 LAB
ERYTHROCYTE [DISTWIDTH] IN BLOOD BY AUTOMATED COUNT: 11.8 % (ref 10–15)
HCT VFR BLD AUTO: 39.1 % (ref 35–47)
HGB BLD-MCNC: 13.5 G/DL (ref 11.7–15.7)
MCH RBC QN AUTO: 30 PG (ref 26.5–33)
MCHC RBC AUTO-ENTMCNC: 34.5 G/DL (ref 31.5–36.5)
MCV RBC AUTO: 87 FL (ref 78–100)
PLATELET # BLD AUTO: 301 10E3/UL (ref 150–450)
RBC # BLD AUTO: 4.5 10E6/UL (ref 3.8–5.2)
WBC # BLD AUTO: 5.5 10E3/UL (ref 4–11)

## 2024-04-03 PROCEDURE — 99396 PREV VISIT EST AGE 40-64: CPT | Performed by: NURSE PRACTITIONER

## 2024-04-03 PROCEDURE — 85027 COMPLETE CBC AUTOMATED: CPT | Performed by: NURSE PRACTITIONER

## 2024-04-03 PROCEDURE — 36415 COLL VENOUS BLD VENIPUNCTURE: CPT | Performed by: NURSE PRACTITIONER

## 2024-04-03 RX ORDER — NORGESTIMATE AND ETHINYL ESTRADIOL 0.25-0.035
1 KIT ORAL DAILY
COMMUNITY
Start: 2023-09-14

## 2024-04-03 SDOH — HEALTH STABILITY: PHYSICAL HEALTH: ON AVERAGE, HOW MANY DAYS PER WEEK DO YOU ENGAGE IN MODERATE TO STRENUOUS EXERCISE (LIKE A BRISK WALK)?: 5 DAYS

## 2024-04-03 ASSESSMENT — SOCIAL DETERMINANTS OF HEALTH (SDOH): HOW OFTEN DO YOU GET TOGETHER WITH FRIENDS OR RELATIVES?: ONCE A WEEK

## 2024-04-03 NOTE — RESULT ENCOUNTER NOTE
Dear Pily,    Here is a summary of your recent test results:    -Normal red blood cell (hgb) levels, normal white blood cell count and normal platelet levels.      Please call us at [phone] (or use [employer]) to address the above recommendations if needed.    Thank you for choosing [facility].  It was an honor and a privilege to participate in your care.       Healthy regards,    Sheila Acuna, JULIOCESAR  [facility]

## 2024-04-03 NOTE — PROGRESS NOTES
"Preventive Care Visit  [facility]  TRELL Omalley CNP, Nurse Practitioner - Family  Apr 3, 2024      Assessment & Plan     Routine general medical examination at a health care facility  Well woman exam with breast exam and Pap smear completed today.    [facility] labs reviewed.   Will notify of lab results.   - REVIEW OF HEALTH MAINTENANCE PROTOCOL ORDERS    Obesity (BMI 30-39.9)  Working with [facility]    Mild intermittent asthma, unspecified whether complicated    Hypothyroidism  She does not actually have hypothyroidism she had thyroid medication started during infertility treatment to conceive.  Will stop this medication and follow labs to see if it is needed.    Screening for deficiency anemia  - CBC with platelets  - CBC with platelets    Encounter for screening mammogram for breast cancer    - MA Screen Bilateral w/Rahul      BMI  Estimated body mass index is 30.46 kg/m  as calculated from the following:    Height as of this encounter: 1.572 m (5' 1.9\").    Weight as of this encounter: 75.3 kg (166 lb).   Weight management plan: Specific weight management program called going to [facility] discussed    Counseling  Appropriate preventive services were discussed with this patient.        Had GYN physical in June of last year.  Plans to transfer over to family practice only.  Is going to [facility] currently and has had labs recently.  The only abnormal lab was noted to be a LDL cholesterol of 177.  CBC was not done.  She plans to follow with you for a goal of weight loss and is starting Wegovy      Return in about 1 year (around 4/3/2025) for Wellness exam fasting labs.     Verónica Nascimento is a 43 year old, presenting for the following:  Physical        4/3/2024     8:47 AM   Additional Questions   Roomed by Gabbi HILLIARD   Accompanied by Self      Labs done through [facility] 02/02/2024 since getting Rx for Wegovy through [facility]  Labs will be scanned into chart    Health Care Directive  Patient does " not have a Health Care Directive or Living Will: Discussed advance care planning with patient; information given to patient to review.    HPI        4/3/2024   General Health   How would you rate your overall physical health? Good   Feel stress (tense, anxious, or unable to sleep) Not at all         4/3/2024   Nutrition   Three or more servings of calcium each day? (!) NO   Diet: Regular (no restrictions)   How many servings of fruit and vegetables per day? (!) 2-3   How many sweetened beverages each day? 0-1         4/3/2024   Exercise   Days per week of moderate/strenous exercise 5 days         4/3/2024   Social Factors   Frequency of gathering with friends or relatives Once a week   Worry food won't last until get money to buy more No   Food not last or not have enough money for food? No   Do you have housing?  Yes   Are you worried about losing your housing? No   Lack of transportation? No   Unable to get utilities (heat,electricity)? No         4/3/2024   Dental   Dentist two times every year? Yes         4/3/2024   TB Screening   Were you born outside of the [place]? No             4/3/2024   Substance Use   Alcohol more than 3/day or more than 7/wk Not Applicable   Do you use any other substances recreationally? No     Social History     Tobacco Use    Smoking status: Never    Smokeless tobacco: Never   Vaping Use    Vaping Use: Never used   Substance Use Topics    Alcohol use: Yes     Comment: So very rarely    Drug use: Not Currently     Types: Marijuana     Comment: So very rarely and nothing since well before baby.           3/16/2023   LAST FHS-7 RESULTS   1st degree relative breast or ovarian cancer No   Any relative bilateral breast cancer No   Any male have breast cancer No   Any ONE woman have BOTH breast AND ovarian cancer No   Any woman with breast cancer before 50yrs No   2 or more relatives with breast AND/OR ovarian cancer No   2 or more relatives with breast AND/OR bowel cancer No       Mammogram  "Screening - Mammogram every 1-2 years updated in Health Maintenance based on mutual decision making          4/3/2024   One time HIV Screening   Previous HIV test? No         4/3/2024   STI Screening   New sexual partner(s) since last STI/HIV test? No     History of abnormal Pap smear: NO - age 30-65 PAP every 5 years with negative HPV co-testing recommended        6/21/2022     8:54 AM 1/16/2014    12:00 AM 1/15/2013     2:19 PM   PAP / HPV   PAP (Historical)  NIL  NIL    PAP-ABSTRACT See Scanned Document             This result is from an external source.     ASCVD Risk   The 10-year ASCVD risk score (Javy HELLER, et al., 2019) is: 0.4%    Values used to calculate the score:      Age: 43 years      Sex: Female      Is Non- : No      Diabetic: No      Tobacco smoker: No      Systolic Blood Pressure: 102 mmHg      Is BP treated: No      HDL Cholesterol: 60 mg/dL      Total Cholesterol: 227 mg/dL        4/3/2024   Contraception/Family Planning   Questions about contraception or family planning No       Reviewed and updated as needed this visit by Provider   Tobacco  Allergies  Meds  Problems  Med Hx  Surg Hx  Fam Hx            Constitutional, HEENT, cardiovascular, pulmonary, GI, , musculoskeletal, neuro, skin, endocrine and psych systems are negative, except as otherwise noted in the HPI.        Objective    Exam  /62 (BP Location: Right arm, Patient Position: Chair, Cuff Size: Adult Large)   Pulse 82   Temp 97.3  F (36.3  C) (Tympanic)   Resp 11   Ht 1.572 m (5' 1.9\")   Wt 75.3 kg (166 lb)   LMP 03/18/2024 (Exact Date)   SpO2 97%   BMI 30.46 kg/m     Estimated body mass index is 30.46 kg/m  as calculated from the following:    Height as of this encounter: 1.572 m (5' 1.9\").    Weight as of this encounter: 75.3 kg (166 lb).    Physical Exam  GENERAL: alert and no distress  EYES: Eyes grossly normal to inspection, PERRL and conjunctivae and sclerae normal  HENT: " ear canals and TM's normal, nose and mouth without ulcers or lesions  NECK: no adenopathy, no asymmetry, masses, or scars  RESP: lungs clear to auscultation - no rales, rhonchi or wheezes  CV: regular rate and rhythm, normal S1 S2, no S3 or S4, no murmur, click or rub, no peripheral edema  ABDOMEN: soft, nontender, no hepatosplenomegaly, no masses and bowel sounds normal  MS: no gross musculoskeletal defects noted, no edema  SKIN: no suspicious lesions or rashes  NEURO: Normal strength and tone, mentation intact and speech normal  PSYCH: mentation appears normal, affect normal/bright         Signed Electronically by: TRELL Omalley CNP

## 2024-04-03 NOTE — PATIENT INSTRUCTIONS
Preventive Care Advice   This is general advice given by our system to help you stay healthy. However, your care team may have specific advice just for you. Please talk to your care team about your preventive care needs.  Nutrition  Eat 5 or more servings of fruits and vegetables each day.  Try wheat bread, brown rice and whole grain pasta (instead of white bread, rice, and pasta).  Get enough calcium and vitamin D. Check the label on foods and aim for 100% of the RDA (recommended daily allowance).  Lifestyle  Exercise at least 150 minutes each week   (30 minutes a day, 5 days a week).  Do muscle strengthening activities 2 days a week. These help control your weight and prevent disease.  No smoking.  Wear sunscreen to prevent skin cancer.  Have a dental exam and cleaning every 6 months.  Yearly exams  See your health care team every year to talk about:  Any changes in your health.  Any medicines your care team has prescribed.  Preventive care, family planning, and ways to prevent chronic diseases.  Shots (vaccines)   HPV shots (up to age 26), if you've never had them before.  Hepatitis B shots (up to age 59), if you've never had them before.  COVID-19 shot: Get this shot when it's due.  Flu shot: Get a flu shot every year.  Tetanus shot: Get a tetanus shot every 10 years.  Pneumococcal, hepatitis A, and RSV shots: Ask your care team if you need these based on your risk.  Shingles shot (for age 50 and up).  General health tests  Diabetes screening:  Starting at age 35, Get screened for diabetes at least every 3 years.  If you are younger than age 35, ask your care team if you should be screened for diabetes.  Cholesterol test: At age 39, start having a cholesterol test every 5 years, or more often if advised.  Bone density scan (DEXA): At age 50, ask your care team if you should have this scan for osteoporosis (brittle bones).  Hepatitis C: Get tested at least once in your life.  STIs (sexually transmitted  infections)  Before age 24: Ask your care team if you should be screened for STIs.  After age 24: Get screened for STIs if you're at risk. You are at risk for STIs (including HIV) if:  You are sexually active with more than one person.  You don't use condoms every time.  You or a partner was diagnosed with a sexually transmitted infection.  If you are at risk for HIV, ask about PrEP medicine to prevent HIV.  Get tested for HIV at least once in your life, whether you are at risk for HIV or not.  Cancer screening tests  Cervical cancer screening: If you have a cervix, begin getting regular cervical cancer screening tests at age 21. Most people who have regular screenings with normal results can stop after age 65. Talk about this with your provider.  Breast cancer scan (mammogram): If you've ever had breasts, begin having regular mammograms starting at age 40. This is a scan to check for breast cancer.  Colon cancer screening: It is important to start screening for colon cancer at age 45.  Have a colonoscopy test every 10 years (or more often if you're at risk) Or, ask your provider about stool tests like a FIT test every year or Cologuard test every 3 years.  To learn more about your testing options, visit: https://www.[employer]/525413.pdf.  For help making a decision, visit: https://bit.ly/hf87856.  Prostate cancer screening test: If you have a prostate and are age 55 to 69, ask your provider if you would benefit from a yearly prostate cancer screening test.  Lung cancer screening: If you are a current or former smoker age 50 to 80, ask your care team if ongoing lung cancer screenings are right for you.  For informational purposes only. Not to replace the advice of your health care provider. Copyright   2023 [employer]. All rights reserved. Clinically reviewed by the [facility]. [employer] 575314 - REV 01/24.

## 2024-04-03 NOTE — LETTER
My Asthma Action Plan    Name: Pily Vargas   YOB: 1980  Date: 4/3/2024   My doctor: TRELL Omalley CNP   My clinic: [facility] [place]        My Rescue Medicine:   Albuterol inhaler (Proair/Ventolin/Proventil HFA)  2-4 puffs EVERY 4 HOURS as needed. Use a spacer if recommended by your provider.   My Asthma Severity:   Intermittent / Exercise Induced  Know your asthma triggers:              GREEN ZONE   Good Control  I feel good  No cough or wheeze  Can work, sleep and play without asthma symptoms       Take your asthma control medicine every day.     If exercise triggers your asthma, take your rescue medication  15 minutes before exercise or sports, and  During exercise if you have asthma symptoms  Spacer to use with inhaler: If you have a spacer, make sure to use it with your inhaler             YELLOW ZONE Getting Worse  I have ANY of these:  I do not feel good  Cough or wheeze  Chest feels tight  Wake up at night   Keep taking your Green Zone medications  Start taking your rescue medicine:  every 20 minutes for up to 1 hour. Then every 4 hours for 24-48 hours.  If you stay in the Yellow Zone for more than 12-24 hours, contact your doctor.  If you do not return to the Green Zone in 12-24 hours or you get worse, start taking your oral steroid medicine if prescribed by your provider.           RED ZONE Medical Alert - Get Help  I have ANY of these:  I feel awful  Medicine is not helping  Breathing getting harder  Trouble walking or talking  Nose opens wide to breathe       Take your rescue medicine NOW  If your provider has prescribed an oral steroid medicine, start taking it NOW  Call your doctor NOW  If you are still in the Red Zone after 20 minutes and you have not reached your doctor:  Take your rescue medicine again and  Call 911 or go to the emergency room right away    See your regular doctor within 2 weeks of an Emergency Room or Urgent Care visit for follow-up  treatment.          Annual Reminders:  Meet with Asthma Educator,  Flu Shot in the Fall, consider Pneumonia Vaccination for patients with asthma (aged 19 and older).    Pharmacy: [facility] - [address]    Electronically signed by TRELL Omalley CNP   Date: 04/03/24                    Asthma Triggers  How To Control Things That Make Your Asthma Worse    Triggers are things that make your asthma worse.  Look at the list below to help you find your triggers and   what you can do about them. You can help prevent asthma flare-ups by staying away from your triggers.      Trigger                                                          What you can do   Cigarette Smoke  Tobacco smoke can make asthma worse. Do not allow smoking in your home, car or around you.  Be sure no one smokes at a child s day care or school.  If you smoke, ask your health care provider for ways to help you quit.  Ask family members to quit too.  Ask your health care provider for a referral to Quit Plan to help you quit smoking, or call 0-897-091-PLAN.     Colds, Flu, Bronchitis  These are common triggers of asthma. Wash your hands often.  Don t touch your eyes, nose or mouth.  Get a flu shot every year.     Dust Mites  These are tiny bugs that live in cloth or carpet. They are too small to see. Wash sheets and blankets in hot water every week.   Encase pillows and mattress in dust mite proof covers.  Avoid having carpet if you can. If you have carpet, vacuum weekly.   Use a dust mask and HEPA vacuum.   Pollen and Outdoor Mold  Some people are allergic to trees, grass, or weed pollen, or molds. Try to keep your windows closed.  Limit time out doors when pollen count is high.   Ask you health care provider about taking medicine during allergy season.     Animal Dander  Some people are allergic to skin flakes, urine or saliva from pets with fur or feathers. Keep pets with fur or feathers out of  your home.    If you can t keep the pet outdoors, then keep the pet out of your bedroom.  Keep the bedroom door closed.  Keep pets off cloth furniture and away from stuffed toys.     Mice, Rats, and Cockroaches  Some people are allergic to the waste from these pests.   Cover food and garbage.  Clean up spills and food crumbs.  Store grease in the refrigerator.   Keep food out of the bedroom.   Indoor Mold  This can be a trigger if your home has high moisture. Fix leaking faucets, pipes, or other sources of water.   Clean moldy surfaces.  Dehumidify basement if it is damp and smelly.   Smoke, Strong Odors, and Sprays  These can reduce air quality. Stay away from strong odors and sprays, such as perfume, powder, hair spray, paints, smoke incense, paint, cleaning products, candles and new carpet.   Exercise or Sports  Some people with asthma have this trigger. Be active!  Ask your doctor about taking medicine before sports or exercise to prevent symptoms.    Warm up for 5-10 minutes before and after sports or exercise.     Other Triggers of Asthma  Cold air:  Cover your nose and mouth with a scarf.  Sometimes laughing or crying can be a trigger.  Some medicines and food can trigger asthma.

## 2024-04-17 ENCOUNTER — ANCILLARY PROCEDURE (OUTPATIENT)
Dept: MAMMOGRAPHY | Facility: CLINIC | Age: 44
End: 2024-04-17
Payer: COMMERCIAL

## 2024-04-17 PROCEDURE — 77067 SCR MAMMO BI INCL CAD: CPT | Mod: TC | Performed by: RADIOLOGY

## 2024-04-17 PROCEDURE — 77063 BREAST TOMOSYNTHESIS BI: CPT | Mod: TC | Performed by: RADIOLOGY

## 2024-04-23 ENCOUNTER — ALLIED HEALTH/NURSE VISIT (OUTPATIENT)
Dept: FAMILY MEDICINE | Facility: CLINIC | Age: 44
End: 2024-04-23
Payer: COMMERCIAL

## 2024-04-23 DIAGNOSIS — Z23 NEED FOR VACCINATION: Primary | ICD-10-CM

## 2024-04-23 PROCEDURE — 90480 ADMN SARSCOV2 VAC 1/ONLY CMP: CPT

## 2024-04-23 PROCEDURE — 99207 PR NO CHARGE NURSE ONLY: CPT

## 2024-04-23 PROCEDURE — 91320 SARSCV2 VAC 30MCG TRS-SUC IM: CPT

## 2024-05-23 ENCOUNTER — MYC MEDICAL ADVICE (OUTPATIENT)
Dept: FAMILY MEDICINE | Facility: CLINIC | Age: 44
End: 2024-05-23

## 2024-05-23 DIAGNOSIS — Z13.29 SCREENING FOR THYROID DISORDER: Primary | ICD-10-CM

## 2024-06-05 ENCOUNTER — LAB (OUTPATIENT)
Dept: LAB | Facility: CLINIC | Age: 44
End: 2024-06-05
Payer: COMMERCIAL

## 2024-06-05 ENCOUNTER — MYC REFILL (OUTPATIENT)
Dept: FAMILY MEDICINE | Facility: CLINIC | Age: 44
End: 2024-06-05

## 2024-06-05 DIAGNOSIS — L30.9 DERMATITIS: ICD-10-CM

## 2024-06-05 DIAGNOSIS — R21 RASH: ICD-10-CM

## 2024-06-05 DIAGNOSIS — Z13.29 SCREENING FOR THYROID DISORDER: ICD-10-CM

## 2024-06-05 PROCEDURE — 84443 ASSAY THYROID STIM HORMONE: CPT

## 2024-06-05 PROCEDURE — 36415 COLL VENOUS BLD VENIPUNCTURE: CPT

## 2024-06-05 RX ORDER — TRIAMCINOLONE ACETONIDE 1 MG/G
CREAM TOPICAL 2 TIMES DAILY
Qty: 30 G | Refills: 1 | Status: SHIPPED | OUTPATIENT
Start: 2024-06-05

## 2024-06-06 LAB — TSH SERPL DL<=0.005 MIU/L-ACNC: 1.48 UIU/ML (ref 0.3–4.2)

## 2024-06-06 NOTE — RESULT ENCOUNTER NOTE
Dear Pily,    Here is a summary of your recent test results:    -TSH (thyroid stimulating hormone) level is normal which indicates normal thyroid function.    For additional lab test information, [email or web address] is an excellent reference.    In addition, here is a list of due or overdue Health Maintenance reminders:    There are no preventive care reminders to display for this patient.    Please call us at [phone] (or use [employer]) to address the above recommendations if needed.    Thank you for choosing  [facility].  It was an honor and a privilege to participate in your care.       Healthy regards,    Sheila Acuna, SHERICEP  [facility]

## 2024-06-13 ENCOUNTER — OFFICE VISIT (OUTPATIENT)
Dept: DERMATOLOGY | Facility: CLINIC | Age: 44
End: 2024-06-13
Payer: COMMERCIAL

## 2024-06-13 DIAGNOSIS — L20.84 INTRINSIC ATOPIC DERMATITIS: ICD-10-CM

## 2024-06-13 DIAGNOSIS — L72.0 MILIA: Primary | ICD-10-CM

## 2024-06-13 DIAGNOSIS — L73.9 FOLLICULITIS: ICD-10-CM

## 2024-06-13 PROCEDURE — 99213 OFFICE O/P EST LOW 20 MIN: CPT | Mod: 25 | Performed by: DERMATOLOGY

## 2024-06-13 PROCEDURE — 10040 EXTRACTION: CPT | Performed by: DERMATOLOGY

## 2024-06-13 NOTE — PROGRESS NOTES
Pily Vargas is an extremely pleasant 43 year old year old female patient here today for follow up atopic derm and folliculitis.  All clear  She notes spot on right orbit.  Patient has no other skin complaints today.  Remainder of the HPI, Meds, PMH, Allergies, FH, and SH was reviewed in chart.      Past Medical History:   Diagnosis Date    Abnormal Pap smear     returned to normal after colposcopy    Animal dander allergy     ASCUS with positive high risk HPV 2012    + HPV 66, declined colp    Asthma     as a child    CARDIOVASCULAR SCREENING; LDL GOAL LESS THAN 160     History of blood transfusion     After     Hypertension     During pregnancy    Seasonal allergies     summer       Past Surgical History:   Procedure Laterality Date    ABDOMEN SURGERY  2022        BIOPSY  1997    Lung issues when I was young    COLONOSCOPY  2020    Ulcerative Proctitis    GYN SURGERY  2022        no surgeries          Family History   Problem Relation Age of Onset    Cancer Maternal Grandmother         Pancreatic     Diabetes Maternal Grandmother     Cardiovascular Maternal Grandmother         Few MI's       Social History     Socioeconomic History    Marital status:      Spouse name: Not on file    Number of children: Not on file    Years of education: Not on file    Highest education level: Not on file   Occupational History    Not on file   Tobacco Use    Smoking status: Never    Smokeless tobacco: Never   Vaping Use    Vaping status: Never Used   Substance and Sexual Activity    Alcohol use: Yes     Comment: So very rarely    Drug use: Not Currently     Types: Marijuana     Comment: So very rarely and nothing since well before baby.    Sexual activity: Yes     Partners: Male     Birth control/protection: None   Other Topics Concern    Parent/sibling w/ CABG, MI or angioplasty before 65F 55M? No   Social History Narrative    Not on file     Social Determinants  of Health     Financial Resource Strain: Low Risk  (4/3/2024)    Financial Resource Strain     Within the past 12 months, have you or your family members you live with been unable to get utilities (heat, electricity) when it was really needed?: No   Food Insecurity: Low Risk  (4/3/2024)    Food Insecurity     Within the past 12 months, did you worry that your food would run out before you got money to buy more?: No     Within the past 12 months, did the food you bought just not last and you didn t have money to get more?: No   Transportation Needs: Low Risk  (4/3/2024)    Transportation Needs     Within the past 12 months, has lack of transportation kept you from medical appointments, getting your medicines, non-medical meetings or appointments, work, or from getting things that you need?: No   Physical Activity: Unknown (4/3/2024)    Exercise Vital Sign     Days of Exercise per Week: 5 days     Minutes of Exercise per Session: Not on file   Stress: No Stress Concern Present (4/3/2024)    [employer] - Occupational Stress Questionnaire     Feeling of Stress : Not at all   Social Connections: Unknown (4/3/2024)    Social Connection and Isolation Panel [NHANES]     Frequency of Communication with Friends and Family: Not on file     Frequency of Social Gatherings with Friends and Family: Once a week     Attends Sabianism Services: Not on file     Active Member of Clubs or Organizations: Not on file     Attends Club or Organization Meetings: Not on file     Marital Status: Not on file   Interpersonal Safety: Low Risk  (4/3/2024)    Interpersonal Safety     Do you feel physically and emotionally safe where you currently live?: Yes     Within the past 12 months, have you been hit, slapped, kicked or otherwise physically hurt by someone?: No     Within the past 12 months, have you been humiliated or emotionally abused in other ways by your partner or ex-partner?: No   Housing Stability: Low  Risk  (4/3/2024)    Housing Stability     Do you have housing? : Yes     Are you worried about losing your housing?: No       Outpatient Encounter Medications as of 6/13/2024   Medication Sig Dispense Refill    albuterol (PROAIR HFA/PROVENTIL HFA/VENTOLIN HFA) 108 (90 Base) MCG/ACT inhaler Inhale 2 puffs into the lungs every 6 hours as needed for shortness of breath Profile Rx: patient will contact pharmacy when needed 18 g 3    amoxicillin (AMOXIL) 500 MG capsule Take 1 capsule (500 mg) by mouth daily 60 capsule 6    Cetirizine HCl (ZYRTEC ALLERGY PO) Take 10 mg by mouth at bedtime      clindamycin-benzoyl peroxide (BENZACLIN) 1-5 % external gel To scalp 50 g 6    Ferrous Sulfate 50 MG TBCR       fluocinonide (LIDEX) 0.05 % external cream Apply topically 2 times daily 120 g 6    loratadine (CLARITIN) 10 MG tablet Take 1 tablet by mouth daily. 90 tablet 3    methylcellulose (CITRUCEL) powder       metroNIDAZOLE (METROGEL) 0.75 % external gel Apply to face BID 45 g 11    Multiple Vitamin (MULTI-VITAMIN) per tablet Take 1 tablet by mouth daily. 90 tablet 3    norgestimate-ethinyl estradiol (ORTHO-CYCLEN) 0.25-35 MG-MCG tablet Take 1 tablet by mouth daily      triamcinolone (KENALOG) 0.1 % external cream Apply topically 2 times daily Avoid face and flexure regions. Use no more than 2 week in a row. 30 g 1    vitamin D3 (CHOLECALCIFEROL) 50 mcg (2000 units) tablet       WEGOVY 0.25 MG/0.5ML pen        No facility-administered encounter medications on file as of 6/13/2024.             O:   NAD, WDWN, Alert & Oriented, Mood & Affect wnl, Vitals stable   General appearance normal   Vitals stable   Alert, oriented and in no acute distress     R orbit white papule  Arms and scalp clear      Eyes: Conjunctivae/lids:Normal     ENT: Lips, mucosa: normal    MSK:Normal    Cardiovascular: peripheral edema none    Pulm: Breathing Normal    Neuro/Psych: Orientation:Alert and Orientedx3 ; Mood/Affect:normal       A/P:  Milia r  orbit  TANGENTIAL EXCISION:  After consent, anesthesia with LEC and prep, tangential excision performed.  No complications and routine wound care.    2. Atopic derm stable with topicals  3. Folliculitis clear  Switch to every other day on Amox  Cont topicals   Return to clinic 6 months  It was a pleasure speaking to Pily AGUERO Sessions today.  Previous clinic notes and pertinent laboratory tests were reviewed prior to Pily Vargas's visit.  Skin care regimen reviewed with patient: Eliminate harsh soaps, i.e. Dial, zest, irsih spring; Mild soaps such as Cetaphil or Dove sensitive skin, avoid hot or cold showers, aggressive use of emollients including vanicream, cetaphil or cerave discussed with patient.

## 2024-06-13 NOTE — LETTER
2024      Pily Vargas   [address]  [facility] [address]      Dear Colleague,    Thank you for referring your patient, Pily Vargas, to the [facility]. Please see a copy of my visit note below.    Pily Vargas is an extremely pleasant 43 year old year old female patient here today for follow up atopic derm and folliculitis.  All clear  She notes spot on right orbit.  Patient has no other skin complaints today.  Remainder of the HPI, Meds, PMH, Allergies, FH, and SH was reviewed in chart.      Past Medical History:   Diagnosis Date     Abnormal Pap smear     returned to normal after colposcopy     Animal dander allergy      ASCUS with positive high risk HPV 2012    + HPV 66, declined colp     Asthma     as a child     CARDIOVASCULAR SCREENING; LDL GOAL LESS THAN 160      History of blood transfusion     After      Hypertension     During pregnancy     Seasonal allergies     summer       Past Surgical History:   Procedure Laterality Date     ABDOMEN SURGERY  2022         BIOPSY  1997    Lung issues when I was young     COLONOSCOPY  2020    Ulcerative Proctitis     GYN SURGERY  2022         no surgeries          Family History   Problem Relation Age of Onset     Cancer Maternal Grandmother         Pancreatic      Diabetes Maternal Grandmother      Cardiovascular Maternal Grandmother         Few MI's       Social History     Socioeconomic History     Marital status:      Spouse name: Not on file     Number of children: Not on file     Years of education: Not on file     Highest education level: Not on file   Occupational History     Not on file   Tobacco Use     Smoking status: Never     Smokeless tobacco: Never   Vaping Use     Vaping status: Never Used   Substance and Sexual Activity     Alcohol use: Yes     Comment: So very rarely     Drug use: Not Currently     Types: Marijuana     Comment: So very  rarely and nothing since well before baby.     Sexual activity: Yes     Partners: Male     Birth control/protection: None   Other Topics Concern     Parent/sibling w/ CABG, MI or angioplasty before 65F 55M? No   Social History Narrative     Not on file     Social Determinants of Health     Financial Resource Strain: Low Risk  (4/3/2024)    Financial Resource Strain      Within the past 12 months, have you or your family members you live with been unable to get utilities (heat, electricity) when it was really needed?: No   Food Insecurity: Low Risk  (4/3/2024)    Food Insecurity      Within the past 12 months, did you worry that your food would run out before you got money to buy more?: No      Within the past 12 months, did the food you bought just not last and you didn t have money to get more?: No   Transportation Needs: Low Risk  (4/3/2024)    Transportation Needs      Within the past 12 months, has lack of transportation kept you from medical appointments, getting your medicines, non-medical meetings or appointments, work, or from getting things that you need?: No   Physical Activity: Unknown (4/3/2024)    Exercise Vital Sign      Days of Exercise per Week: 5 days      Minutes of Exercise per Session: Not on file   Stress: No Stress Concern Present (4/3/2024)    [employer] - Occupational Stress Questionnaire      Feeling of Stress : Not at all   Social Connections: Unknown (4/3/2024)    Social Connection and Isolation Panel [NHANES]      Frequency of Communication with Friends and Family: Not on file      Frequency of Social Gatherings with Friends and Family: Once a week      Attends Alevism Services: Not on file      Active Member of Clubs or Organizations: Not on file      Attends Club or Organization Meetings: Not on file      Marital Status: Not on file   Interpersonal Safety: Low Risk  (4/3/2024)    Interpersonal Safety      Do you feel physically and emotionally safe where  you currently live?: Yes      Within the past 12 months, have you been hit, slapped, kicked or otherwise physically hurt by someone?: No      Within the past 12 months, have you been humiliated or emotionally abused in other ways by your partner or ex-partner?: No   Housing Stability: Low Risk  (4/3/2024)    Housing Stability      Do you have housing? : Yes      Are you worried about losing your housing?: No       Outpatient Encounter Medications as of 6/13/2024   Medication Sig Dispense Refill     albuterol (PROAIR HFA/PROVENTIL HFA/VENTOLIN HFA) 108 (90 Base) MCG/ACT inhaler Inhale 2 puffs into the lungs every 6 hours as needed for shortness of breath Profile Rx: patient will contact pharmacy when needed 18 g 3     amoxicillin (AMOXIL) 500 MG capsule Take 1 capsule (500 mg) by mouth daily 60 capsule 6     Cetirizine HCl (ZYRTEC ALLERGY PO) Take 10 mg by mouth at bedtime       clindamycin-benzoyl peroxide (BENZACLIN) 1-5 % external gel To scalp 50 g 6     Ferrous Sulfate 50 MG TBCR        fluocinonide (LIDEX) 0.05 % external cream Apply topically 2 times daily 120 g 6     loratadine (CLARITIN) 10 MG tablet Take 1 tablet by mouth daily. 90 tablet 3     methylcellulose (CITRUCEL) powder        metroNIDAZOLE (METROGEL) 0.75 % external gel Apply to face BID 45 g 11     Multiple Vitamin (MULTI-VITAMIN) per tablet Take 1 tablet by mouth daily. 90 tablet 3     norgestimate-ethinyl estradiol (ORTHO-CYCLEN) 0.25-35 MG-MCG tablet Take 1 tablet by mouth daily       triamcinolone (KENALOG) 0.1 % external cream Apply topically 2 times daily Avoid face and flexure regions. Use no more than 2 week in a row. 30 g 1     vitamin D3 (CHOLECALCIFEROL) 50 mcg (2000 units) tablet        WEGOVY 0.25 MG/0.5ML pen        No facility-administered encounter medications on file as of 6/13/2024.             O:   NAD, WDWN, Alert & Oriented, Mood & Affect wnl, Vitals stable   General appearance normal   Vitals stable   Alert, oriented and in no  acute distress     R orbit white papule  Arms and scalp clear      Eyes: Conjunctivae/lids:Normal     ENT: Lips, mucosa: normal    MSK:Normal    Cardiovascular: peripheral edema none    Pulm: Breathing Normal    Neuro/Psych: Orientation:Alert and Orientedx3 ; Mood/Affect:normal       A/P:  Milia r orbit  TANGENTIAL EXCISION:  After consent, anesthesia with LEC and prep, tangential excision performed.  No complications and routine wound care.    2. Atopic derm stable with topicals  3. Folliculitis clear  Switch to every other day on Amox  Cont topicals   Return to clinic 6 months  It was a pleasure speaking to Pily S Sessions today.  Previous clinic notes and pertinent laboratory tests were reviewed prior to Pily S Sessions's visit.  Skin care regimen reviewed with patient: Eliminate harsh soaps, i.e. Dial, zest, irsih spring; Mild soaps such as Cetaphil or Dove sensitive skin, avoid hot or cold showers, aggressive use of emollients including vanicream, cetaphil or cerave discussed with patient.        Again, thank you for allowing me to participate in the care of your patient.        Sincerely,        Cooper Mcclelland MD

## 2024-07-11 ENCOUNTER — MYC MEDICAL ADVICE (OUTPATIENT)
Dept: FAMILY MEDICINE | Facility: CLINIC | Age: 44
End: 2024-07-11
Payer: COMMERCIAL

## 2024-07-12 ENCOUNTER — NURSE TRIAGE (OUTPATIENT)
Dept: FAMILY MEDICINE | Facility: CLINIC | Age: 44
End: 2024-07-12
Payer: COMMERCIAL

## 2024-07-12 NOTE — TELEPHONE ENCOUNTER
Unfortunately have 0 openings today we are completely full in clinic please ensure no gallbladder chest or lung pain that needs higher level of care today.  Otherwise okay to be on a schedule Monday.      Healthy regards,            Sheila Acuna, SHERICEP-BC

## 2024-07-12 NOTE — TELEPHONE ENCOUNTER
"Nurse Triage SBAR    Is this a 2nd Level Triage? YES, LICENSED PRACTITIONER REVIEW IS REQUIRED    Situation: Pain under the ribcage (diaphragm area), mostly on the right side x weeks ago.     Background: patient is unsure if the pain is related to her weight loss. Patient exercises regularly and takes Wegovy (started on April). She also mentioned experiencing similar pain when she was younger, which has worsened and more frequent overtime.    Assessment: Per patient's [employer] message: \"While sitting, I'm feeling a pain under my rib cage. It's mostly on the right side. I can sometimes relieve it by pressing in and there is a shift.\" Pain scale of 4/10 (patient states that she has high pain tolerance). Pain comes and goes with certain movement. It is often relieved if pt leans back, or if she shifts or moves in a certain way. States that if she inhale deeply, she can feel the pressure. No other symptoms.     Protocol Recommended Disposition:   See in Office Today    Recommendation: Patient would like to see first if Sheila Acuna can work her in today. Otherwise, she is willing to come in Monday. Routing to care team for review. Advised when to call back and escalate care.     Routed to provider    Does the patient meet one of the following criteria for ADS visit consideration? 16+ years old, with an [facility] PCP     TIP  Providers, please consider if this condition is appropriate for management at one of our Acute and Diagnostic Services sites.     If patient is a good candidate, please use dotphrase <dot>triageresponse and select Refer to ADS to document.    Reason for Disposition   Patient wants to be seen    Additional Information   Negative: SEVERE difficulty breathing (e.g., struggling for each breath, speaks in single words)   Negative: Shock suspected (e.g., cold/pale/clammy skin, too weak to stand, low BP, rapid pulse)   Negative: Difficult to awaken or acting confused (e.g., disoriented, slurred speech)   " Negative: Passed out (i.e., lost consciousness, collapsed and was not responding)   Negative: Visible sweat on face or sweat is dripping down   Negative: Sounds like a life-threatening emergency to the triager   Negative: Followed an abdomen (stomach) injury   Negative: Chest pain   Negative: Abdominal pain and pregnant < 20 weeks   Negative: Abdominal pain and pregnant 20 or more weeks   Negative: Abdomen bloating or swelling are main symptoms   Negative: SEVERE abdominal pain (e.g., excruciating)   Negative: Pain lasting > 10 minutes and over 50 years old   Negative: Pain lasting > 10 minutes and over 40 years old and associated chest, arm, neck, upper back, or jaw pain   Negative: Pain lasting > 10 minutes and over 35 years old and at least one cardiac risk factor (e.g., diabetes, high cholesterol, hypertension, obesity, smoker or strong family history of heart disease)   Negative: Pain lasting > 10 minutes and history of heart disease (i.e., heart attack, bypass surgery, angina, angioplasty, CHF)   Negative: Pain lasts > 10 minutes and difficulty breathing   Negative: Vomiting red blood or black (coffee ground) material  (Exception: Few streaks and only occurred once.)   Negative: Blood in bowel movements  (Exception: Blood on surface of BM with constipation.)   Negative: Black or tarry bowel movements  (Exception: Chronic-unchanged black-grey BMs AND is taking iron pills or Pepto-Bismol.)   Negative: Pregnant 20 weeks or more and new hand or face swelling   Negative: MILD TO MODERATE constant pain lasting > 2 hours   Negative: MILD TO MODERATE pain and not relieved by antacid medicine   Negative: Vomiting bile (green color)   Negative: Patient sounds very sick or weak to the triager   Negative: Vomiting and abdomen looks much more swollen than usual   Negative: White of the eyes have turned yellow (i.e., jaundice)   Negative: Fever > 103 F (39.4 C)   Negative: Fever > 101 F (38.3 C) and over 60 years of age    "Negative: Fever > 100.0 F (37.8 C) and has diabetes mellitus or a weak immune system (e.g., HIV positive, cancer chemotherapy, organ transplant, splenectomy, chronic steroids)   Negative: Fever > 100.0 F (37.8 C) and bedridden (e.g., CVA, chronic illness, recovering from surgery)    Answer Assessment - Initial Assessment Questions  1. LOCATION: \"Where does it hurt?\"       Under the ribcage (diaphragm area), mostly on the right side.   2. RADIATION: \"Does the pain shoot anywhere else?\" (e.g., chest, back)      no  3. ONSET: \"When did the pain begin?\" (e.g., minutes, hours or days ago)        A few weeks ago  4. SUDDEN: \"Gradual or sudden onset?\"      Sudden.  5. PATTERN \"Does the pain come and go, or is it constant?\"     - If it comes and goes: \"How long does it last?\" \"Do you have pain now?\"      (Note: Comes and goes means the pain is intermittent. It goes away completely between bouts.)     - If constant: \"Is it getting better, staying the same, or getting worse?\"       (Note: Constant means the pain never goes away completely; most serious pain is constant and gets worse.)       Comes and goes with position and movement.  6. SEVERITY: \"How bad is the pain?\"  (e.g., Scale 1-10; mild, moderate, or severe)     - MILD (1-3): Doesn't interfere with normal activities, abdomen soft and not tender to touch..      - MODERATE (4-7): Interferes with normal activities or awakens from sleep, abdomen tender to touch.      - SEVERE (8-10): Excruciating pain, doubled over, unable to do any normal activities.        3 or 4 (patient states that she has high pain tolerance)  7. RECURRENT SYMPTOM: \"Have you ever had this type of stomach pain before?\" If Yes, ask: \"When was the last time?\" and \"What happened that time?\"       Yes. When patient was younger.   8. AGGRAVATING FACTORS: \"Does anything seem to cause this pain?\" (e.g., foods, stress, alcohol)      Unsure but feels the pain with certain movements or position. Unsure if the " "pain is related to her weight loss. Patient exercises regularly and takes Wegovy. She also mentioned experiencing similar pain when she was younger, which has worsened overtime.  9. CARDIAC SYMPTOMS: \"Do you have any of the following symptoms: chest pain, difficulty breathing, sweating, nausea?\"      No  10. OTHER SYMPTOMS: \"Do you have any other symptoms?\" (e.g., back pain, diarrhea, fever, urination pain, vomiting)        no  11. PREGNANCY: \"Is there any chance you are pregnant?\" \"When was your last menstrual period?\"        No    Protocols used: Abdominal Pain - Upper-A-[place]    "

## 2024-07-12 NOTE — TELEPHONE ENCOUNTER
Called patient and relayed provider's message. Patient denies having chest pain or gallbladder pain. Patient reassured that she doesn't have symptoms that might require higher level of care.     Patient requested appointment on Tuesday instead as Monday doesn't work for her. Scheduled.

## 2024-07-16 ENCOUNTER — OFFICE VISIT (OUTPATIENT)
Dept: FAMILY MEDICINE | Facility: CLINIC | Age: 44
End: 2024-07-16
Payer: COMMERCIAL

## 2024-07-16 VITALS
HEIGHT: 62 IN | WEIGHT: 147 LBS | BODY MASS INDEX: 27.05 KG/M2 | HEART RATE: 93 BPM | RESPIRATION RATE: 12 BRPM | OXYGEN SATURATION: 99 % | TEMPERATURE: 97.4 F | DIASTOLIC BLOOD PRESSURE: 68 MMHG | SYSTOLIC BLOOD PRESSURE: 117 MMHG

## 2024-07-16 DIAGNOSIS — R10.13 EPIGASTRIC PAIN: Primary | ICD-10-CM

## 2024-07-16 DIAGNOSIS — K43.9 UNCOMPLICATED EPIGASTRIC HERNIA: ICD-10-CM

## 2024-07-16 PROCEDURE — 99213 OFFICE O/P EST LOW 20 MIN: CPT | Performed by: NURSE PRACTITIONER

## 2024-07-16 ASSESSMENT — ENCOUNTER SYMPTOMS: ABDOMINAL PAIN: 1

## 2024-07-16 NOTE — PROGRESS NOTES
Assessment & Plan     Epigastric pain  Uncomplicated epigastric hernia  Symptoms most consistent with hernia no acute concerns.  At homes cares.   Avoid heavy lifting or constipation.   Referral to Gen surgery if becomes bothersome.   Pily verbalizes understanding of plan of care and is in agreement.      Return in about 4 weeks (around 8/13/2024) for Recheck, if symptoms persist or worsening please be seen.       Subjective   Pily is a 43 year old, presenting for the following health issues:  Abdominal Pain        7/16/2024     8:13 AM   Additional Questions   Roomed by Gabbi HILLIARD   Accompanied by Self     History of Present Illness       Reason for visit:  Under rib soreness and discomfort  Symptom onset:  1-2 weeks ago  Symptoms include:  Pressure and discomfort under rib cage after sitting for longer than few minutes  Symptom intensity:  Mild  Symptom progression:  Staying the same  Had these symptoms before:  Yes  Has tried/received treatment for these symptoms:  No  What makes it worse:  Staying in that position  What makes it better:  Pressing into the area and feeling something move, adjusting back or standing up    She eats 0-1 servings of fruits and vegetables daily.She consumes 0 sweetened beverage(s) daily.She exercises with enough effort to increase her heart rate 30 to 60 minutes per day.  She exercises with enough effort to increase her heart rate 3 or less days per week.   She is taking medications regularly.     Both sides of rib cage have discomfort  Sitting will create a pressure feeling.  Leaning further back, standing , or laying down or adjusting with pushing and shifting area that is hurting,     TRIAGED 07/12/2024 -   Nurse Triage SBAR     Is this a 2nd Level Triage? YES, LICENSED PRACTITIONER REVIEW IS REQUIRED     Situation: Pain under the ribcage (diaphragm area), mostly on the right side x weeks ago.      Background: patient is unsure if the pain is related to her weight loss. Patient  "exercises regularly and takes Wegovy (started on April). She also mentioned experiencing similar pain when she was younger, which has worsened and more frequent overtime.     Assessment: Per patient's [employer] message: \"While sitting, I'm feeling a pain under my rib cage. It's mostly on the right side. I can sometimes relieve it by pressing in and there is a shift.\" Pain scale of 4/10 (patient states that she has high pain tolerance). Pain comes and goes with certain movement. It is often relieved if pt leans back, or if she shifts or moves in a certain way. States that if she inhale deeply, she can feel the pressure. No other symptoms.      Protocol Recommended Disposition:   See in Office Today     Recommendation: Patient would like to see first if Sheila Acuna can work her in today. Otherwise, she is willing to come in Monday. Routing to care team for review. Advised when to call back and escalate care.      Routed to provider     Does the patient meet one of the following criteria for ADS visit consideration? 16+ years old, with an [facility] PCP      TIP  Providers, please consider if this condition is appropriate for management at one of our Acute and Diagnostic Services sites.      If patient is a good candidate, please use dotphrase <dot>triageresponse and select Refer to ADS to document.     Reason for Disposition   Patient wants to be seen      Answer Assessment - Initial Assessment Questions  1. LOCATION: \"Where does it hurt?\"       Under the ribcage (diaphragm area), mostly on the right side.   2. RADIATION: \"Does the pain shoot anywhere else?\" (e.g., chest, back)      no  3. ONSET: \"When did the pain begin?\" (e.g., minutes, hours or days ago)        A few weeks ago  4. SUDDEN: \"Gradual or sudden onset?\"      Sudden.  5. PATTERN \"Does the pain come and go, or is it constant?\"     - If it comes and goes: \"How long does it last?\" \"Do you have pain now?\"      (Note: Comes and goes means the pain is intermittent. It " "goes away completely between bouts.)     - If constant: \"Is it getting better, staying the same, or getting worse?\"       (Note: Constant means the pain never goes away completely; most serious pain is constant and gets worse.)       Comes and goes with position and movement.  6. SEVERITY: \"How bad is the pain?\"  (e.g., Scale 1-10; mild, moderate, or severe)     - MILD (1-3): Doesn't interfere with normal activities, abdomen soft and not tender to touch..      - MODERATE (4-7): Interferes with normal activities or awakens from sleep, abdomen tender to touch.      - SEVERE (8-10): Excruciating pain, doubled over, unable to do any normal activities.        3 or 4 (patient states that she has high pain tolerance)  7. RECURRENT SYMPTOM: \"Have you ever had this type of stomach pain before?\" If Yes, ask: \"When was the last time?\" and \"What happened that time?\"       Yes. When patient was younger.   8. AGGRAVATING FACTORS: \"Does anything seem to cause this pain?\" (e.g., foods, stress, alcohol)      Unsure but feels the pain with certain movements or position. Unsure if the pain is related to her weight loss. Patient exercises regularly and takes Wegovy. She also mentioned experiencing similar pain when she was younger, which has worsened overtime.  9. CARDIAC SYMPTOMS: \"Do you have any of the following symptoms: chest pain, difficulty breathing, sweating, nausea?\"      No  10. OTHER SYMPTOMS: \"Do you have any other symptoms?\" (e.g., back pain, diarrhea, fever, urination pain, vomiting)        no  11. PREGNANCY: \"Is there any chance you are pregnant?\" \"When was your last menstrual period?\"        No    Protocols used: Abdominal Pain - Upper-A-[place]          Wt Readings from Last 5 Encounters:   07/16/24 66.7 kg (147 lb)   04/03/24 75.3 kg (166 lb)   03/22/24 74.8 kg (165 lb)   03/01/24 76.9 kg (169 lb 8 oz)   03/03/23 76.2 kg (168 lb)      On wegovy  goal     Constitutional, HEENT, cardiovascular, pulmonary, GI, , " "musculoskeletal, neuro, skin, endocrine and psych systems are negative, except as otherwise noted in the HPI.       Objective    /68 (BP Location: Right arm, Patient Position: Chair, Cuff Size: Adult Regular)   Pulse 93   Temp 97.4  F (36.3  C) (Tympanic)   Resp 12   Ht 1.572 m (5' 1.9\")   Wt 66.7 kg (147 lb)   LMP 07/01/2024 (Exact Date)   SpO2 99%   BMI 26.97 kg/m    Body mass index is 26.97 kg/m .  Physical Exam   GENERAL: alert and no distress  RESP: lungs clear to auscultation - no rales, rhonchi or wheezes  CV: regular rate and rhythm, normal S1 S2, no S3 or S4, no murmur, click or rub, no peripheral edema  ABDOMEN: soft, nontender, no hepatosplenomegaly, small ? epigastric hernia.   SKIN: no suspicious lesions or rashes         Signed Electronically by: TRELL Omalley CNP    "

## 2024-10-31 ENCOUNTER — IMMUNIZATION (OUTPATIENT)
Dept: FAMILY MEDICINE | Facility: CLINIC | Age: 44
End: 2024-10-31
Payer: COMMERCIAL

## 2024-10-31 DIAGNOSIS — Z23 ENCOUNTER FOR IMMUNIZATION: Primary | ICD-10-CM

## 2024-10-31 PROCEDURE — 99207 PR NO CHARGE NURSE ONLY: CPT

## 2024-10-31 PROCEDURE — 90471 IMMUNIZATION ADMIN: CPT

## 2024-10-31 PROCEDURE — 90656 IIV3 VACC NO PRSV 0.5 ML IM: CPT

## 2024-10-31 PROCEDURE — 91320 SARSCV2 VAC 30MCG TRS-SUC IM: CPT

## 2024-10-31 PROCEDURE — 90480 ADMN SARSCOV2 VAC 1/ONLY CMP: CPT

## 2024-10-31 NOTE — PROGRESS NOTES
Prior to immunization administration, verified patients identity using patient s name and date of birth. Please see Immunization Activity for additional information.     Is the patient's temperature normal (100.5 or less)? Yes     Patient MEETS CRITERIA. PROCEED with vaccine administration.      Patient instructed to remain in clinic for 15 minutes afterwards, and to report any adverse reactions.      Link to Ancillary Visit Immunization Standing Orders SmartSet     Screening performed by Eden Castellon CMA on 10/31/2024 at 9:26 AM.

## 2024-12-12 ENCOUNTER — VIRTUAL VISIT (OUTPATIENT)
Dept: DERMATOLOGY | Facility: CLINIC | Age: 44
End: 2024-12-12
Payer: COMMERCIAL

## 2024-12-12 DIAGNOSIS — L73.9 FOLLICULITIS: Primary | ICD-10-CM

## 2024-12-12 NOTE — LETTER
"2024      Pily Vargas   [address]  [facility] [address]      Dear Colleague,    Thank you for referring your patient, Pily Vargas, to the [facility]. Please see a copy of my visit note below.        Pily Vargas is a 44 year old female who is being evaluated via a phone  visit.      The patient has been notified of following:     \"This phone  visit will be conducted via a call between you and your physician/provider. We have found that certain health care needs can be provided without the need for an in-person physical exam.  This service lets us provide the care you need with a video conversation.  If a prescription is necessary we can send it directly to your pharmacy.  If lab work is needed we can place an order for that and you can then stop by our lab to have the test done at a later time.    Phone visits are billed at different rates depending on your insurance coverage.  Please reach out to your insurance provider with any questions.    If during the course of the call the physician/provider feels a phone visit is not appropriate, you will not be charged for this service.\"    Patient has given verbal consent for phone visit? Yes    How would you like to obtain your AVS? Fabt    Pily Vargas is a 44 year old year old female patient here today for follow up folliculitis on amox every other day, all clear no issues.  Patient has no other skin complaints today.  Remainder of the HPI, Meds, PMH, Allergies, FH, and SH was reviewed in chart.      Past Medical History:   Diagnosis Date     Abnormal Pap smear     returned to normal after colposcopy     Animal dander allergy      ASCUS with positive high risk HPV 2012    + HPV 66, declined colp     Asthma     as a child     CARDIOVASCULAR SCREENING; LDL GOAL LESS THAN 160      History of blood transfusion     After      Hypertension     During pregnancy     Seasonal allergies     summer "       Past Surgical History:   Procedure Laterality Date     ABDOMEN SURGERY  2022         BIOPSY  1997    Lung issues when I was young     COLONOSCOPY  2020    Ulcerative Proctitis     GYN SURGERY  2022         no surgeries          Family History   Problem Relation Age of Onset     Cancer Maternal Grandmother         Pancreatic      Diabetes Maternal Grandmother      Cardiovascular Maternal Grandmother         Few MI's       Social History     Socioeconomic History     Marital status:      Spouse name: Not on file     Number of children: Not on file     Years of education: Not on file     Highest education level: Not on file   Occupational History     Not on file   Tobacco Use     Smoking status: Never     Smokeless tobacco: Never   Vaping Use     Vaping status: Never Used   Substance and Sexual Activity     Alcohol use: Yes     Comment: So very rarely     Drug use: Not Currently     Types: Marijuana     Comment: So very rarely and nothing since well before baby.     Sexual activity: Yes     Partners: Male     Birth control/protection: None   Other Topics Concern     Parent/sibling w/ CABG, MI or angioplasty before 65F 55M? No   Social History Narrative     Not on file     Social Drivers of Health     Financial Resource Strain: Low Risk  (4/3/2024)    Financial Resource Strain      Within the past 12 months, have you or your family members you live with been unable to get utilities (heat, electricity) when it was really needed?: No   Food Insecurity: Low Risk  (4/3/2024)    Food Insecurity      Within the past 12 months, did you worry that your food would run out before you got money to buy more?: No      Within the past 12 months, did the food you bought just not last and you didn t have money to get more?: No   Transportation Needs: Low Risk  (4/3/2024)    Transportation Needs      Within the past 12 months, has lack of transportation kept you from medical  appointments, getting your medicines, non-medical meetings or appointments, work, or from getting things that you need?: No   Physical Activity: Unknown (4/3/2024)    Exercise Vital Sign      Days of Exercise per Week: 5 days      Minutes of Exercise per Session: Not on file   Stress: No Stress Concern Present (4/3/2024)    [employer] - Occupational Stress Questionnaire      Feeling of Stress : Not at all   Social Connections: Unknown (4/3/2024)    Social Connection and Isolation Panel [NHANES]      Frequency of Communication with Friends and Family: Not on file      Frequency of Social Gatherings with Friends and Family: Once a week      Attends Christian Services: Not on file      Active Member of Clubs or Organizations: Not on file      Attends Club or Organization Meetings: Not on file      Marital Status: Not on file   Interpersonal Safety: Low Risk  (4/3/2024)    Interpersonal Safety      Do you feel physically and emotionally safe where you currently live?: Yes      Within the past 12 months, have you been hit, slapped, kicked or otherwise physically hurt by someone?: No      Within the past 12 months, have you been humiliated or emotionally abused in other ways by your partner or ex-partner?: No   Housing Stability: Low Risk  (4/3/2024)    Housing Stability      Do you have housing? : Yes      Are you worried about losing your housing?: No       Outpatient Encounter Medications as of 12/12/2024   Medication Sig Dispense Refill     albuterol (PROAIR HFA/PROVENTIL HFA/VENTOLIN HFA) 108 (90 Base) MCG/ACT inhaler Inhale 2 puffs into the lungs every 6 hours as needed for shortness of breath Profile Rx: patient will contact pharmacy when needed 18 g 3     amoxicillin (AMOXIL) 500 MG capsule Take 1 capsule (500 mg) by mouth daily 60 capsule 6     Cetirizine HCl (ZYRTEC ALLERGY PO) Take 10 mg by mouth at bedtime       clindamycin-benzoyl peroxide (BENZACLIN) 1-5 % external gel To scalp  (Patient not taking: Reported on 7/16/2024) 50 g 6     Ferrous Sulfate 50 MG TBCR        fluocinonide (LIDEX) 0.05 % external cream Apply topically 2 times daily 120 g 6     loratadine (CLARITIN) 10 MG tablet Take 1 tablet by mouth daily. 90 tablet 3     methylcellulose (CITRUCEL) powder        metroNIDAZOLE (METROGEL) 0.75 % external gel Apply to face BID 45 g 11     Multiple Vitamin (MULTI-VITAMIN) per tablet Take 1 tablet by mouth daily. 90 tablet 3     norgestimate-ethinyl estradiol (ORTHO-CYCLEN) 0.25-35 MG-MCG tablet Take 1 tablet by mouth daily (Patient not taking: Reported on 7/16/2024)       Semaglutide-Weight Management (WEGOVY) 1.7 MG/0.75ML pen Inject 1.7 mg subcutaneously once a week       triamcinolone (KENALOG) 0.1 % external cream Apply topically 2 times daily Avoid face and flexure regions. Use no more than 2 week in a row. 30 g 1     vitamin D3 (CHOLECALCIFEROL) 50 mcg (2000 units) tablet        No facility-administered encounter medications on file as of 12/12/2024.             Review Of Systems  Skin: As above  Eyes: negative  Ears/Nose/Throat: negative  Respiratory: No shortness of breath, dyspnea on exertion, cough, or hemoptysis  Cardiovascular: negative  Gastrointestinal: negative  Genitourinary: negative  Musculoskeletal: negative  Neurologic: negative  Psychiatric: negative  Hematologic/Lymphatic/Immunologic: negative  Endocrine: negative      O:   Alert & Orientedx3, Mood & Affect wnl,    General appearance normal   Alert, oriented and in no acute distress    Clear per patient     Pulm: Breathing Normal, talking in normal sentences, no shortness of breath during conversation    Neuro/Psych: Orientation:Alert and Orientedx3 ; Mood/Affect:normal ; no anxiety or depression     A/P:  1.Folliculitis all clear  Cont amox every other day   Topicals prn   It was a pleasure speaking to Pily AGUERO Sessions today.  Previous clinic  notes and pertinent laboratory tests were reviewed prior to Pily AGUERO  Sessions's visit.  Return to clinic 12 months   Teledermatology information:  - Location of patient: home  - Location of teledermatologist[place]    - Reason teledermatology is appropriate: of National Emergency Regarding Coronavirus disease (COVID 19) Outbreak  - The patient's condition can safely be assessed using telemedicine: yes  - Method of transmission: store and forward teledermatology  - In-person dermatology visit recommendation: no  - Service start time:900am/pm  - Service end time:915am/pm  - Date of report: 12/12/24      Again, thank you for allowing me to participate in the care of your patient.        Sincerely,        Cooper Mcclelland MD

## 2024-12-12 NOTE — PROGRESS NOTES
"    Pily Vargas is a 44 year old female who is being evaluated via a phone  visit.      The patient has been notified of following:     \"This phone  visit will be conducted via a call between you and your physician/provider. We have found that certain health care needs can be provided without the need for an in-person physical exam.  This service lets us provide the care you need with a video conversation.  If a prescription is necessary we can send it directly to your pharmacy.  If lab work is needed we can place an order for that and you can then stop by our lab to have the test done at a later time.    Phone visits are billed at different rates depending on your insurance coverage.  Please reach out to your insurance provider with any questions.    If during the course of the call the physician/provider feels a phone visit is not appropriate, you will not be charged for this service.\"    Patient has given verbal consent for phone visit? Yes    How would you like to obtain your AVS? Cara Vargas is a 44 year old year old female patient here today for follow up folliculitis on amox every other day, all clear no issues.  Patient has no other skin complaints today.  Remainder of the HPI, Meds, PMH, Allergies, FH, and SH was reviewed in chart.      Past Medical History:   Diagnosis Date    Abnormal Pap smear     returned to normal after colposcopy    Animal dander allergy     ASCUS with positive high risk HPV 2012    + HPV 66, declined colp    Asthma     as a child    CARDIOVASCULAR SCREENING; LDL GOAL LESS THAN 160     History of blood transfusion     After     Hypertension     During pregnancy    Seasonal allergies     summer       Past Surgical History:   Procedure Laterality Date    ABDOMEN SURGERY  2022        BIOPSY  1997    Lung issues when I was young    COLONOSCOPY  2020    Ulcerative Proctitis    GYN SURGERY  2022        no surgeries  "         Family History   Problem Relation Age of Onset    Cancer Maternal Grandmother         Pancreatic     Diabetes Maternal Grandmother     Cardiovascular Maternal Grandmother         Few MI's       Social History     Socioeconomic History    Marital status:      Spouse name: Not on file    Number of children: Not on file    Years of education: Not on file    Highest education level: Not on file   Occupational History    Not on file   Tobacco Use    Smoking status: Never    Smokeless tobacco: Never   Vaping Use    Vaping status: Never Used   Substance and Sexual Activity    Alcohol use: Yes     Comment: So very rarely    Drug use: Not Currently     Types: Marijuana     Comment: So very rarely and nothing since well before baby.    Sexual activity: Yes     Partners: Male     Birth control/protection: None   Other Topics Concern    Parent/sibling w/ CABG, MI or angioplasty before 65F 55M? No   Social History Narrative    Not on file     Social Drivers of Health     Financial Resource Strain: Low Risk  (4/3/2024)    Financial Resource Strain     Within the past 12 months, have you or your family members you live with been unable to get utilities (heat, electricity) when it was really needed?: No   Food Insecurity: Low Risk  (4/3/2024)    Food Insecurity     Within the past 12 months, did you worry that your food would run out before you got money to buy more?: No     Within the past 12 months, did the food you bought just not last and you didn t have money to get more?: No   Transportation Needs: Low Risk  (4/3/2024)    Transportation Needs     Within the past 12 months, has lack of transportation kept you from medical appointments, getting your medicines, non-medical meetings or appointments, work, or from getting things that you need?: No   Physical Activity: Unknown (4/3/2024)    Exercise Vital Sign     Days of Exercise per Week: 5 days     Minutes of Exercise per Session: Not on file   Stress: No Stress  Concern Present (4/3/2024)    [employer] - Occupational Stress Questionnaire     Feeling of Stress : Not at all   Social Connections: Unknown (4/3/2024)    Social Connection and Isolation Panel [NHANES]     Frequency of Communication with Friends and Family: Not on file     Frequency of Social Gatherings with Friends and Family: Once a week     Attends [employer] Services: Not on file     Active Member of Clubs or Organizations: Not on file     Attends Club or Organization Meetings: Not on file     Marital Status: Not on file   Interpersonal Safety: Low Risk  (4/3/2024)    Interpersonal Safety     Do you feel physically and emotionally safe where you currently live?: Yes     Within the past 12 months, have you been hit, slapped, kicked or otherwise physically hurt by someone?: No     Within the past 12 months, have you been humiliated or emotionally abused in other ways by your partner or ex-partner?: No   Housing Stability: Low Risk  (4/3/2024)    Housing Stability     Do you have housing? : Yes     Are you worried about losing your housing?: No       Outpatient Encounter Medications as of 12/12/2024   Medication Sig Dispense Refill    albuterol (PROAIR HFA/PROVENTIL HFA/VENTOLIN HFA) 108 (90 Base) MCG/ACT inhaler Inhale 2 puffs into the lungs every 6 hours as needed for shortness of breath Profile Rx: patient will contact pharmacy when needed 18 g 3    amoxicillin (AMOXIL) 500 MG capsule Take 1 capsule (500 mg) by mouth daily 60 capsule 6    Cetirizine HCl (ZYRTEC ALLERGY PO) Take 10 mg by mouth at bedtime      clindamycin-benzoyl peroxide (BENZACLIN) 1-5 % external gel To scalp (Patient not taking: Reported on 7/16/2024) 50 g 6    Ferrous Sulfate 50 MG TBCR       fluocinonide (LIDEX) 0.05 % external cream Apply topically 2 times daily 120 g 6    loratadine (CLARITIN) 10 MG tablet Take 1 tablet by mouth daily. 90 tablet 3    methylcellulose (CITRUCEL) powder       metroNIDAZOLE  (METROGEL) 0.75 % external gel Apply to face BID 45 g 11    Multiple Vitamin (MULTI-VITAMIN) per tablet Take 1 tablet by mouth daily. 90 tablet 3    norgestimate-ethinyl estradiol (ORTHO-CYCLEN) 0.25-35 MG-MCG tablet Take 1 tablet by mouth daily (Patient not taking: Reported on 7/16/2024)      Semaglutide-Weight Management (WEGOVY) 1.7 MG/0.75ML pen Inject 1.7 mg subcutaneously once a week      triamcinolone (KENALOG) 0.1 % external cream Apply topically 2 times daily Avoid face and flexure regions. Use no more than 2 week in a row. 30 g 1    vitamin D3 (CHOLECALCIFEROL) 50 mcg (2000 units) tablet        No facility-administered encounter medications on file as of 12/12/2024.             Review Of Systems  Skin: As above  Eyes: negative  Ears/Nose/Throat: negative  Respiratory: No shortness of breath, dyspnea on exertion, cough, or hemoptysis  Cardiovascular: negative  Gastrointestinal: negative  Genitourinary: negative  Musculoskeletal: negative  Neurologic: negative  Psychiatric: negative  Hematologic/Lymphatic/Immunologic: negative  Endocrine: negative      O:   Alert & Orientedx3, Mood & Affect wnl,    General appearance normal   Alert, oriented and in no acute distress    Clear per patient     Pulm: Breathing Normal, talking in normal sentences, no shortness of breath during conversation    Neuro/Psych: Orientation:Alert and Orientedx3 ; Mood/Affect:normal ; no anxiety or depression     A/P:  1.Folliculitis all clear  Cont amox every other day   Topicals prn   It was a pleasure speaking to Pily AGUERO Sessions today.  Previous clinic  notes and pertinent laboratory tests were reviewed prior to Pily Vargas's visit.  Return to clinic 12 months   Teledermatology information:  - Location of patient: home  - Location of teledermatologist[place]    - Reason teledermatology is appropriate: of National Emergency Regarding Coronavirus disease (COVID 19) Outbreak  - The patient's condition can safely be assessed using  telemedicine: yes  - Method of transmission: store and forward teledermatology  - In-person dermatology visit recommendation: no  - Service start time:900am/pm  - Service end time:915am/pm  - Date of report: 12/12/24

## 2025-02-20 SDOH — HEALTH STABILITY: PHYSICAL HEALTH: ON AVERAGE, HOW MANY DAYS PER WEEK DO YOU ENGAGE IN MODERATE TO STRENUOUS EXERCISE (LIKE A BRISK WALK)?: 3 DAYS

## 2025-02-20 SDOH — HEALTH STABILITY: PHYSICAL HEALTH: ON AVERAGE, HOW MANY MINUTES DO YOU ENGAGE IN EXERCISE AT THIS LEVEL?: 30 MIN

## 2025-02-20 ASSESSMENT — ASTHMA QUESTIONNAIRES
ACT_TOTALSCORE: 25
ACT_TOTALSCORE: 25
QUESTION_2 LAST FOUR WEEKS HOW OFTEN HAVE YOU HAD SHORTNESS OF BREATH: NOT AT ALL
QUESTION_1 LAST FOUR WEEKS HOW MUCH OF THE TIME DID YOUR ASTHMA KEEP YOU FROM GETTING AS MUCH DONE AT WORK, SCHOOL OR AT HOME: NONE OF THE TIME
QUESTION_5 LAST FOUR WEEKS HOW WOULD YOU RATE YOUR ASTHMA CONTROL: COMPLETELY CONTROLLED
QUESTION_3 LAST FOUR WEEKS HOW OFTEN DID YOUR ASTHMA SYMPTOMS (WHEEZING, COUGHING, SHORTNESS OF BREATH, CHEST TIGHTNESS OR PAIN) WAKE YOU UP AT NIGHT OR EARLIER THAN USUAL IN THE MORNING: NOT AT ALL
QUESTION_4 LAST FOUR WEEKS HOW OFTEN HAVE YOU USED YOUR RESCUE INHALER OR NEBULIZER MEDICATION (SUCH AS ALBUTEROL): NOT AT ALL

## 2025-02-20 ASSESSMENT — SOCIAL DETERMINANTS OF HEALTH (SDOH): HOW OFTEN DO YOU GET TOGETHER WITH FRIENDS OR RELATIVES?: ONCE A WEEK

## 2025-02-25 ENCOUNTER — MYC MEDICAL ADVICE (OUTPATIENT)
Dept: FAMILY MEDICINE | Facility: CLINIC | Age: 45
End: 2025-02-25

## 2025-02-25 ENCOUNTER — OFFICE VISIT (OUTPATIENT)
Dept: FAMILY MEDICINE | Facility: CLINIC | Age: 45
End: 2025-02-25
Payer: COMMERCIAL

## 2025-02-25 VITALS
HEIGHT: 62 IN | OXYGEN SATURATION: 100 % | BODY MASS INDEX: 23.53 KG/M2 | DIASTOLIC BLOOD PRESSURE: 66 MMHG | WEIGHT: 127.9 LBS | HEART RATE: 87 BPM | RESPIRATION RATE: 12 BRPM | SYSTOLIC BLOOD PRESSURE: 112 MMHG | TEMPERATURE: 96.2 F

## 2025-02-25 DIAGNOSIS — Z13.220 SCREENING FOR HYPERLIPIDEMIA: ICD-10-CM

## 2025-02-25 DIAGNOSIS — Z00.00 ROUTINE GENERAL MEDICAL EXAMINATION AT A HEALTH CARE FACILITY: Primary | ICD-10-CM

## 2025-02-25 DIAGNOSIS — J45.20 MILD INTERMITTENT ASTHMA, UNSPECIFIED WHETHER COMPLICATED: ICD-10-CM

## 2025-02-25 DIAGNOSIS — Z13.29 SCREENING FOR THYROID DISORDER: ICD-10-CM

## 2025-02-25 DIAGNOSIS — Z13.0 SCREENING FOR DEFICIENCY ANEMIA: ICD-10-CM

## 2025-02-25 DIAGNOSIS — Z79.899 MEDICATION MANAGEMENT: ICD-10-CM

## 2025-02-25 DIAGNOSIS — E66.9 OBESITY (BMI 30-39.9): ICD-10-CM

## 2025-02-25 DIAGNOSIS — Z13.1 SCREENING FOR DIABETES MELLITUS: ICD-10-CM

## 2025-02-25 DIAGNOSIS — L65.9 HAIR LOSS: ICD-10-CM

## 2025-02-25 LAB
ALBUMIN SERPL BCG-MCNC: 4.5 G/DL (ref 3.5–5.2)
ALP SERPL-CCNC: 69 U/L (ref 40–150)
ALT SERPL W P-5'-P-CCNC: 14 U/L (ref 0–50)
ANION GAP SERPL CALCULATED.3IONS-SCNC: 11 MMOL/L (ref 7–15)
AST SERPL W P-5'-P-CCNC: 21 U/L (ref 0–45)
BILIRUB SERPL-MCNC: 0.3 MG/DL
BUN SERPL-MCNC: 18 MG/DL (ref 6–20)
CALCIUM SERPL-MCNC: 9.6 MG/DL (ref 8.8–10.4)
CHLORIDE SERPL-SCNC: 103 MMOL/L (ref 98–107)
CHOLEST SERPL-MCNC: 214 MG/DL
CREAT SERPL-MCNC: 0.7 MG/DL (ref 0.51–0.95)
EGFRCR SERPLBLD CKD-EPI 2021: >90 ML/MIN/1.73M2
ERYTHROCYTE [DISTWIDTH] IN BLOOD BY AUTOMATED COUNT: 12 % (ref 10–15)
FASTING STATUS PATIENT QL REPORTED: YES
FASTING STATUS PATIENT QL REPORTED: YES
GLUCOSE SERPL-MCNC: 97 MG/DL (ref 70–99)
HCO3 SERPL-SCNC: 25 MMOL/L (ref 22–29)
HCT VFR BLD AUTO: 37 % (ref 35–47)
HDLC SERPL-MCNC: 68 MG/DL
HGB BLD-MCNC: 12.9 G/DL (ref 11.7–15.7)
LDLC SERPL CALC-MCNC: 136 MG/DL
MCH RBC QN AUTO: 30.9 PG (ref 26.5–33)
MCHC RBC AUTO-ENTMCNC: 34.9 G/DL (ref 31.5–36.5)
MCV RBC AUTO: 89 FL (ref 78–100)
NONHDLC SERPL-MCNC: 146 MG/DL
PLATELET # BLD AUTO: 314 10E3/UL (ref 150–450)
POTASSIUM SERPL-SCNC: 4.2 MMOL/L (ref 3.4–5.3)
PROT SERPL-MCNC: 7.8 G/DL (ref 6.4–8.3)
RBC # BLD AUTO: 4.17 10E6/UL (ref 3.8–5.2)
SODIUM SERPL-SCNC: 139 MMOL/L (ref 135–145)
TRIGL SERPL-MCNC: 51 MG/DL
WBC # BLD AUTO: 6.2 10E3/UL (ref 4–11)

## 2025-02-25 PROCEDURE — 36415 COLL VENOUS BLD VENIPUNCTURE: CPT | Performed by: NURSE PRACTITIONER

## 2025-02-25 PROCEDURE — 80053 COMPREHEN METABOLIC PANEL: CPT | Performed by: NURSE PRACTITIONER

## 2025-02-25 PROCEDURE — 84443 ASSAY THYROID STIM HORMONE: CPT | Performed by: NURSE PRACTITIONER

## 2025-02-25 PROCEDURE — 80061 LIPID PANEL: CPT | Performed by: NURSE PRACTITIONER

## 2025-02-25 PROCEDURE — 85027 COMPLETE CBC AUTOMATED: CPT | Performed by: NURSE PRACTITIONER

## 2025-02-25 RX ORDER — MINOXIDIL 10 MG/1
10 TABLET ORAL DAILY
COMMUNITY
Start: 2025-02-25

## 2025-02-25 NOTE — PROGRESS NOTES
Preventive Care Visit  [facility]  TRELL Omalley CNP, Nurse Practitioner - Family  Feb 25, 2025    {PROVIDER CHARTING PREFERENCE:354372}    Verónica Nascimento is a 44 year old, presenting for the following:  Physical        2/25/2025    10:42 AM   Additional Questions   Roomed by Ailyn ARMINDA   Accompanied by Self          HPI      April 40 pounds  Wegovy 1.7 then     Wt Readings from Last 5 Encounters:   02/25/25 58 kg (127 lb 14.4 oz)   07/16/24 66.7 kg (147 lb)   04/03/24 75.3 kg (166 lb)   03/22/24 74.8 kg (165 lb)   03/01/24 76.9 kg (169 lb 8 oz)        Health Care Directive  Patient does not have a Health Care Directive: Discussed advance care planning with patient; however, patient declined at this time.      2/20/2025   General Health   How would you rate your overall physical health? Good   Feel stress (tense, anxious, or unable to sleep) Not at all         2/20/2025   Nutrition   Three or more servings of calcium each day? (!) NO   Diet: Regular (no restrictions)   How many servings of fruit and vegetables per day? (!) 0-1   How many sweetened beverages each day? 0-1         2/20/2025   Exercise   Days per week of moderate/strenous exercise 3 days   Average minutes spent exercising at this level 30 min         2/20/2025   Social Factors   Frequency of gathering with friends or relatives Once a week   Worry food won't last until get money to buy more No   Food not last or not have enough money for food? No   Do you have housing? (Housing is defined as stable permanent housing and does not include staying ouside in a car, in a tent, in an abandoned building, in an overnight shelter, or couch-surfing.) Yes   Are you worried about losing your housing? No   Lack of transportation? No   Unable to get utilities (heat,electricity)? No         2/20/2025   Dental   Dentist two times every year? Yes         4/3/2024   TB Screening   Were you born outside of the [place]? No         Today's PHQ-2  Score:       2/25/2025    10:38 AM   PHQ-2 ( 1999 [employer])   Q1: Little interest or pleasure in doing things 0   Q2: Feeling down, depressed or hopeless 0   PHQ-2 Score 0    Q1: Little interest or pleasure in doing things Not at all   Q2: Feeling down, depressed or hopeless Not at all   PHQ-2 Score 0       Patient-reported           2/20/2025   Substance Use   Alcohol more than 3/day or more than 7/wk No   Do you use any other substances recreationally? No     Social History     Tobacco Use    Smoking status: Never    Smokeless tobacco: Never   Vaping Use    Vaping status: Never Used   Substance Use Topics    Alcohol use: Yes     Comment: So very rarely    Drug use: Not Currently     Types: Marijuana     Comment: So very rarely and nothing since well before baby.         4/17/2024   LAST FHS-7 RESULTS   1st degree relative breast or ovarian cancer No   Any relative bilateral breast cancer No   Any male have breast cancer No   Any ONE woman have BOTH breast AND ovarian cancer No   Any woman with breast cancer before 50yrs No   2 or more relatives with breast AND/OR ovarian cancer No   2 or more relatives with breast AND/OR bowel cancer No     Mammogram Screening - Mammogram every 1-2 years updated in Health Maintenance based on mutual decision making        2/20/2025   STI Screening   New sexual partner(s) since last STI/HIV test? No     History of abnormal Pap smear: { :544577}        6/21/2022     8:54 AM 1/16/2014    12:00 AM 1/15/2013     2:19 PM   PAP / HPV   PAP (Historical)  NIL  NIL    PAP-ABSTRACT See Scanned Document             This result is from an external source.     ASCVD Risk   The 10-year ASCVD risk score (Javy HELLER, et al., 2019) is: 0.5%    Values used to calculate the score:      Age: 44 years      Sex: Female      Is Non- : No      Diabetic: No      Tobacco smoker: No      Systolic Blood Pressure: 112 mmHg      Is BP treated: No      HDL Cholesterol: 60 mg/dL       "Total Cholesterol: 227 mg/dL        2/20/2025   Contraception/Family Planning   Questions about contraception or family planning No     {Provider  REQUIRED FOR AWV Use the storyboard to review patient history, after sections have been marked as reviewed, refresh note to capture documentation:212933}   Reviewed and updated as needed this visit by Provider                    {HISTORY OPTIONS (Optional):892818}    {ROS Picklists (Optional):147673}     Objective    Exam  /66   Pulse 87   Temp (!) 96.2  F (35.7  C) (Tympanic)   Resp 12   Ht 1.572 m (5' 1.9\")   Wt 58 kg (127 lb 14.4 oz)   LMP 02/22/2025 (Exact Date)   SpO2 100%   BMI 23.47 kg/m     Estimated body mass index is 23.47 kg/m  as calculated from the following:    Height as of this encounter: 1.572 m (5' 1.9\").    Weight as of this encounter: 58 kg (127 lb 14.4 oz).    Physical Exam  {Exam Choices (Optional):366758}        Signed Electronically by: TRELL Omalley CNP  {Email feedback regarding this note to [email or web address]   :698298}  "

## 2025-02-26 LAB — TSH SERPL DL<=0.005 MIU/L-ACNC: 1.27 UIU/ML (ref 0.3–4.2)

## 2025-03-12 NOTE — TELEPHONE ENCOUNTER
No contraindication for Wegovy as it is NOT a medullary thyroid cancer her sister had.    Updated patient's history.    Healthy regards,            Sheila Acuna, FNP-BC

## 2025-03-17 DIAGNOSIS — L73.9 FOLLICULITIS: ICD-10-CM

## 2025-03-17 DIAGNOSIS — L20.84 INTRINSIC ATOPIC DERMATITIS: ICD-10-CM

## 2025-03-17 DIAGNOSIS — L71.9 ROSACEA: ICD-10-CM

## 2025-03-17 RX ORDER — AMOXICILLIN 500 MG/1
500 CAPSULE ORAL DAILY
Qty: 60 CAPSULE | Refills: 6 | Status: SHIPPED | OUTPATIENT
Start: 2025-03-17

## 2025-03-17 NOTE — TELEPHONE ENCOUNTER
Requested Prescriptions   Pending Prescriptions Disp Refills    amoxicillin (AMOXIL) 500 MG capsule 60 capsule 6     Sig: Take 1 capsule (500 mg) by mouth daily.       There is no refill protocol information for this order          Last office visit: Visit date not found ; last virtual visit: Visit date not found with prescribing provider:  Cooper Mcclelland   Future Office Visit:      Thank you,  Akila Nascimento  [facility]  [address] [place] [address]  Priority line: [phone] (please do not share number with patient)   Employed by [employer]

## 2025-05-27 ENCOUNTER — VIRTUAL VISIT (OUTPATIENT)
Dept: FAMILY MEDICINE | Facility: CLINIC | Age: 45
End: 2025-05-27
Payer: COMMERCIAL

## 2025-05-27 DIAGNOSIS — Z86.39 HISTORY OF OBESITY IN ADULTHOOD: ICD-10-CM

## 2025-05-27 PROCEDURE — 98005 SYNCH AUDIO-VIDEO EST LOW 20: CPT | Performed by: NURSE PRACTITIONER

## 2025-05-27 NOTE — PROGRESS NOTES
Pily is a 44 year old who is being evaluated via a billable video visit.    How would you like to obtain your AVS? MyChart  If the video visit is dropped, the invitation should be resent by: Text to cell phone: [phone]  Will anyone else be joining your video visit? No    Assessment & Plan     History of obesity in adulthood    - semaglutide-weight management (WEGOVY) 0.25 MG/0.5ML pen  Dispense: 2 mL; Refill: 0      Assessment & Plan  History of obesity in adulthood:  - Currently at a normal weight with a BMI under 25. Concerns about maintaining weight and potential weight gain. Previous weight loss was significant, and currently on Wegovy 0.25 mg weekly.  - Continue Wegovy 0.25 mg weekly. Monitor weight and adjust as necessary. Check with pharmacy if insurance will cover a three-month supply. Report any significant weight changes or concerns.    Return in about 9 months (around 2/27/2026) for Wellness exam fasting labs.     Subjective   Pily is a 44 year old, presenting for the following health issues:  Recheck Medication        5/27/2025     2:54 PM   Additional Questions   Roomed by Gabbi HILLIARD   Accompanied by Self     History of Present Illness       Reason for visit:  Request take over of medication   She is taking medications regularly.   - Name: Pily Vargas  - Currently on a 0.25 mg dose of a medication obtained through an independent service with a coupon, reducing the cost to approximately $50.  - Gained about 6 lbs after being weaned off a higher dose   - Previously on 0.5 mg, which was reduced due to weight loss.  - Current weight is 134 lbs, which is higher than previously recorded.  - Reports feeling overweight   - Concerned about recent weight gain and wishes to manage it.  - Insurance is covering at least part of the medication cost through a coupon.  Wegovy - last was 0.25 - last injection was 05/15/2025  Has gone up in weight since being on the 0.25 has gained 6-7lbs back.      Wt Readings  from Last 5 Encounters:   02/25/25 58 kg (127 lb 14.4 oz)   07/16/24 66.7 kg (147 lb)   04/03/24 75.3 kg (166 lb)   03/22/24 74.8 kg (165 lb)   03/01/24 76.9 kg (169 lb 8 oz)          Review of Systems  Constitutional, neuro, ENT, endocrine, pulmonary, cardiac, gastrointestinal, genitourinary, musculoskeletal, integument and psychiatric systems are negative, except as otherwise noted.      Objective           Vitals:  No vitals were obtained today due to virtual visit.    Physical Exam   GENERAL: alert and no distress  EYES: Eyes grossly normal to inspection.  No discharge or erythema, or obvious scleral/conjunctival abnormalities.  RESP: No audible wheeze, cough, or visible cyanosis.    SKIN: Visible skin clear. No significant rash, abnormal pigmentation or lesions.  NEURO: Cranial nerves grossly intact.  Mentation and speech appropriate for age.  PSYCH: Appropriate affect, tone, and pace of words    Video-Visit Details    Type of service:  Video Visit   Originating Location (pt. Location): Home  Distant Location (provider location):  On-site  Platform used for Video Visit: Kayla     Signed Electronically by: TRELL Omalley CNP

## 2025-05-27 NOTE — PATIENT INSTRUCTIONS
Based on our discussion, I have outlined the following instructions for you:      - Keep taking Wegovy 0.25 mg once a week.  - Keep an eye on your weight regularly and make changes if needed.  - Ask your pharmacy if your insurance can pay for a three-month supply of Wegovy.  - Let someone know if you notice any big changes in your weight or have any worries about it.    Thank you again for your visit, and we look forward to supporting you in your journey to better health.

## 2025-06-29 ENCOUNTER — NURSE TRIAGE (OUTPATIENT)
Dept: FAMILY MEDICINE | Facility: CLINIC | Age: 45
End: 2025-06-29
Payer: COMMERCIAL

## 2025-07-01 DIAGNOSIS — Z86.39 HISTORY OF OBESITY IN ADULTHOOD: ICD-10-CM

## 2025-07-01 RX ORDER — SEMAGLUTIDE 0.25 MG/.5ML
INJECTION, SOLUTION SUBCUTANEOUS
Qty: 2 ML | Refills: 0 | Status: SHIPPED | OUTPATIENT
Start: 2025-07-01

## 2025-07-02 NOTE — TELEPHONE ENCOUNTER
Any other recommendations - routing to Sheila Acuna.     Situation   Patient called back about manuel   Hi there, I m experiencing some fairly regular post-menstrual abdominal pain. It usually lasts a few days after the end of my period. This has been a thing for a while now, but it s lasted a bit longer the last few periods. Any ideas? Concerns? Should I come in?     Background   Had a baby 3 years ago- has had heavier periods with clots since. - had c section    Assessment     Usual soreness in lower abdomen after menstrual cycle lasts about 2 days cramping and occasional sharp pain.     Date of last period June 13th spotting or bleeding for 9 days usually solid 7 days.    Post menstrual cramping pain started 1-2 days after her period but lasted about a week instead of 2 days like her normal.     She states the lower abdominal discomfort felt like like period discomfort   3/10 constant Cramping soreness   5-6/10 occasional sharp pain   It is typical that she has severe cramping 1st couple days of her period  The pain has resolved now.     Denied any constipation or stool abnormalities   Denied fevers or any other symptoms     Recommendations   Patient declined needing to be seen until next week as the pain is gone.   Something new, reoccurring before appointment or worsening call back and talk with a  triage nurse.   Explained date, location , arrival and apt time   Future Appointments 7/2/2025 - 12/29/2025        Date Visit Type Length Department Provider     7/8/2025  3:30 PM OFFICE VISIT 30 min  F[facility]ACT[facility] Janie Johnson APRN CNP    Location Instructions:     [facility] is located at [address] [place] [address], along [address] Free parking is available; access the lot by turning north from [address] onto [address], then west onto [address].

## 2025-07-02 NOTE — TELEPHONE ENCOUNTER
Reason for Disposition    Patient wants to be seen    Pain present > 3 days and normally menstrual cramps last 1 to 3 days    Additional Information    Negative: Sounds like a life-threatening emergency to the triager    Negative: Abdominal cramps unrelated to menstrual period    Negative: SEVERE pain and pain clearly increases with coughing    Negative: SEVERE vaginal bleeding (e.g., soaking 2 pads or tampons per hour and present 2 or more hours; 1 menstrual cup every 2 hours)    Negative: Patient sounds very sick or weak to the triager    Negative: Fever > 100.4 F (38.0 C)    Negative: Pregnancy suspsected (e.g., missed last menstrual period)    Negative: SEVERE pain (e.g., excruciating cramps) and worse than ever before and not improved with ibuprofen or naproxen    Negative: MODERATE vaginal bleeding (e.g., soaking 1 pad or tampon per hour and present > 6 hours; 1 menstrual cup every 6 hours)    Negative: Pain only on one side    Negative: Unusual vaginal discharge before period began    Protocols used: Abdominal Pain - Menstrual Cramps-A-OH

## 2025-07-03 ENCOUNTER — OFFICE VISIT (OUTPATIENT)
Dept: FAMILY MEDICINE | Facility: CLINIC | Age: 45
End: 2025-07-03
Payer: COMMERCIAL

## 2025-07-03 ENCOUNTER — RESULTS FOLLOW-UP (OUTPATIENT)
Dept: FAMILY MEDICINE | Facility: CLINIC | Age: 45
End: 2025-07-03

## 2025-07-03 VITALS
OXYGEN SATURATION: 98 % | DIASTOLIC BLOOD PRESSURE: 62 MMHG | TEMPERATURE: 98.7 F | SYSTOLIC BLOOD PRESSURE: 110 MMHG | BODY MASS INDEX: 24.24 KG/M2 | HEART RATE: 95 BPM | WEIGHT: 132.1 LBS | RESPIRATION RATE: 18 BRPM

## 2025-07-03 DIAGNOSIS — D25.2 SUBSEROUS LEIOMYOMA OF UTERUS: Primary | ICD-10-CM

## 2025-07-03 DIAGNOSIS — N94.6 MENSTRUAL CRAMP: ICD-10-CM

## 2025-07-03 DIAGNOSIS — N94.10 DYSPAREUNIA IN FEMALE: ICD-10-CM

## 2025-07-03 DIAGNOSIS — N93.9 ABNORMAL UTERINE BLEEDING: ICD-10-CM

## 2025-07-03 DIAGNOSIS — N83.9 LESION OF LEFT OVARY: ICD-10-CM

## 2025-07-03 DIAGNOSIS — N93.9 ABNORMAL UTERINE BLEEDING: Primary | ICD-10-CM

## 2025-07-03 LAB
ERYTHROCYTE [DISTWIDTH] IN BLOOD BY AUTOMATED COUNT: 12 % (ref 10–15)
FSH SERPL IRP2-ACNC: 2.5 MIU/ML
HCT VFR BLD AUTO: 40.3 % (ref 35–47)
HGB BLD-MCNC: 13.8 G/DL (ref 11.7–15.7)
MCH RBC QN AUTO: 30.1 PG (ref 26.5–33)
MCHC RBC AUTO-ENTMCNC: 34.2 G/DL (ref 31.5–36.5)
MCV RBC AUTO: 88 FL (ref 78–100)
PLATELET # BLD AUTO: 291 10E3/UL (ref 150–450)
RBC # BLD AUTO: 4.59 10E6/UL (ref 3.8–5.2)
TSH SERPL DL<=0.005 MIU/L-ACNC: 1.17 UIU/ML (ref 0.3–4.2)
WBC # BLD AUTO: 6.5 10E3/UL (ref 4–11)

## 2025-07-03 RX ORDER — TRIAMCINOLONE ACETONIDE 1 MG/G
CREAM TOPICAL
COMMUNITY
Start: 2025-06-07

## 2025-07-03 NOTE — TELEPHONE ENCOUNTER
Patient called back was advised of providers note     Patient is not in pain today, was not in pain yesterday either.      Patient rescheduled for today- explained location, arrival and apt time.   Cancelled Tuesday 7/8

## 2025-07-03 NOTE — TELEPHONE ENCOUNTER
Recommend OV asap versus ADS if acute pain today.  She needs vaginal exam,  labs and imaging.         Healthy regards,            Sheila Acuna, FNP-BC

## 2025-07-03 NOTE — PROGRESS NOTES
Assessment & Plan     Abnormal uterine bleeding  Menstrual cramp  Will start workup with labs as below and referral was placed for transvaginal ultrasound. If any abnormalities noted, will refer to OB/GYN for further evaluation.   - US Pelvic Complete with Transvaginal  - CBC with platelets  - Follicle stimulating hormone  - TSH with free T4 reflex    Dyspareunia in female   Encouraged patient to continue pelvic floor exercises. US will also tell us if there is a structural component to her pain.     Patient should follow up based on labs/imaging or sooner for new or worsening symptoms. All questions answered to patient's satisfaction.     Tracey Fabian PA-C    Subjective   Pily is a 44 year old, presenting for the following health issues:  menstral problems        7/3/2025     1:47 PM   Additional Questions   Roomed by frankie carmen   Accompanied by self     History of Present Illness       Reason for visit:  Post period cramping  Symptom onset:  More than a month  Symptoms include:  Cramping  Symptom intensity:  Mild  Symptom progression:  Worsening  Had these symptoms before:  Yes  Has tried/received treatment for these symptoms:  No  What makes it better:  Iboprofen   She is taking medications regularly.      Pily is a 44 year old female who presents today with abnormal uterine bleeding and post-menstrual cramping for the last few months.     Patient delivered her baby 3 years ago via . Since then, her periods have been more irregular, heavier, and with more severe cramping compared to before her baby was born.     The last 3 months her periods have been 9 days long with heavy bleeding the first 4 days. She will wear both a tampon and pad due to the amount of blood. She sees more blood clots in the toilet. Prior to having her baby periods were 7 days and very regular. She conceived her baby with help from fertility specialists. Throughout this process, she was taking synthroid to keep TSH exactly at 1.0.  Stopped synthroid about 1 year ago.     She then will experience cramping 1-2 days after the periods.     Sonoita is painful to start, but improves a few minutes into the act. Has been focusing on pelvic floor therapy/exercises.     No urinary symptoms. Had a UTI last month and this improved with antibiotics. Did this virtually. No urine culture.     No stool chagnse. She has ulcerative proctitis, but has been asymptomatic for a long time.     In the last year her mom, dad, and sister were diagnosed with cancers. Sister- thyroid cancer. Mom- lymphatic(?) cancer. Dad- brain tumor.     Review of Systems  Constitutional, neuro, ENT, endocrine, pulmonary, cardiac, gastrointestinal, genitourinary, musculoskeletal, integument and psychiatric systems are negative, except as otherwise noted.      Past Medical History:   Diagnosis Date    Abnormal Pap smear     returned to normal after colposcopy    Animal dander allergy     ASCUS with positive high risk HPV 2012    + HPV 66, declined colp    Asthma     as a child    CARDIOVASCULAR SCREENING; LDL GOAL LESS THAN 160     History of blood transfusion     After     Hypertension     During pregnancy    Seasonal allergies     summer     Past Surgical History:   Procedure Laterality Date    ABDOMEN SURGERY  2022        BIOPSY  1997    Lung issues when I was young    COLONOSCOPY  2020    Ulcerative Proctitis    GYN SURGERY  2022        no surgeries       Allergies   Allergen Reactions    Dust Mites Other (See Comments)    Minocycline Hives     Pt thinks it was this one, it was prescribed by her dermatologist.      Objective    /62   Pulse 95   Temp 98.7  F (37.1  C) (Tympanic)   Resp 18   Wt 59.9 kg (132 lb 1.6 oz)   LMP 2025 (Exact Date)   SpO2 98%   BMI 24.24 kg/m    Body mass index is 24.24 kg/m .  Physical Exam   GENERAL: alert and no distress  NECK: no adenopathy, no asymmetry, masses, or  scars  RESP: lungs clear to auscultation - no rales, rhonchi or wheezes  CV: regular rate and rhythm, normal S1 S2, no S3 or S4, no murmur, click or rub, no peripheral edema  ABDOMEN: soft, nontender, no hepatosplenomegaly, no masses and bowel sounds normal  MS: no gross musculoskeletal defects noted, no edema  SKIN: no suspicious lesions or rashes  PSYCH: mentation appears normal, affect normal/bright      Signed Electronically by: Tracey Fabian PA-C

## 2025-07-03 NOTE — TELEPHONE ENCOUNTER
Attempt # 1    Called # [phone]     Left a non detailed VM to call back at [phone] and ask for any available Triage Nurse. [facility] has 2 admin spots that could be used. 2:00 pm and 4:30 pm    Marce Parekh RN  [facility]

## 2025-07-22 ENCOUNTER — HOSPITAL ENCOUNTER (OUTPATIENT)
Dept: ULTRASOUND IMAGING | Facility: CLINIC | Age: 45
Discharge: HOME | End: 2025-07-22
Payer: COMMERCIAL

## 2025-07-22 DIAGNOSIS — N93.9 ABNORMAL UTERINE BLEEDING: ICD-10-CM

## 2025-07-22 PROCEDURE — 76856 US EXAM PELVIC COMPLETE: CPT

## 2025-07-29 ENCOUNTER — E-VISIT (OUTPATIENT)
Dept: FAMILY MEDICINE | Facility: CLINIC | Age: 45
End: 2025-07-29
Payer: COMMERCIAL

## 2025-07-29 DIAGNOSIS — Z11.3 SCREENING EXAMINATION FOR VENEREAL DISEASE: ICD-10-CM

## 2025-07-29 DIAGNOSIS — Z11.59 NEED FOR HEPATITIS C SCREENING TEST: Primary | ICD-10-CM

## 2025-07-29 DIAGNOSIS — R30.0 DYSURIA: ICD-10-CM

## 2025-07-30 NOTE — PATIENT INSTRUCTIONS
Exposure to Sexually Transmitted Infections: Care Instructions  Overview  Sexually transmitted infections (STIs) are infections spread by sexual contact. This includes genital skin-to-skin contact and vaginal, oral, and anal sex. If you're pregnant, you can also spread them to the fetus before or during the birth.  STIs are common. But they don't always cause symptoms. And if they are not treated, they can lead to health problems. Testing and treatment are important to help protect the health of you and your partner or partners.  STIs caused by bacteria can go away with treatment. STIs caused by viruses can be treated to relieve symptoms, but treatment won't make these STIs go away.  Your doctor may have done testing and prescribed you treatment. Or if your partner is being treated for certain STIs, their doctor may have prescribed medicine for you.  Follow-up care is a key part of your treatment and safety. Be sure to make and go to all appointments, and call your doctor if you are having problems. It's also a good idea to know your test results and keep a list of the medicines you take.  How can you care for yourself at home?  Take medicines exactly as prescribed.  If you were prescribed antibiotics, take them as directed. Don't stop taking them just because you feel better. You need to take the full course of antibiotics.  If your partner s doctor prescribed you antibiotics, it is important that you still follow up with your doctor after taking them. If you are pregnant, have an allergy to the medicine, or have other concerns about the medicine, talk to your doctor before taking it.  Tell your sex partner or partners that they will need treatment. Your doctor may be able to prescribe them treatment for certain STIs.  Don't have sexual contact while you have symptoms of an STI or are being treated for an STI.  Don't douche. Douching changes the normal balance of bacteria in the vagina. It may increase the risk of  spreading the infection to the uterus or other reproductive organs.  Get any tests your doctor suggests. For certain STIs, your doctor may recommend you get tested again several months after treatment.  How can you prevent sexually transmitted infections (STIs)?  Here are some ways to help prevent STIs.  Limit your sex partners. Sex with one partner who has sex only with you can reduce your risk of getting an STI.  Talk with your partner or partners about STIs before you have sex. Find out if they are at risk for an STI. Remember that it's possible to have an STI and not know it.  Wait to have sex with new partners until you've each been tested.  Don't have sex if you have symptoms of an infection or if you are being treated for an STI.  Use a condom every time you have sex. Condoms are the only form of birth control that also helps prevent STIs.  If you had sex without a condom, ask your doctor if taking a preventive medicine is recommended. It may help prevent certain STIs if it's taken within 24 to 72 hours after unprotected sex.  Don't share sex toys. But if you do share them, use a condom and clean the sex toys between each use.  Vaccines are available for some STIs, such as HPV. Ask your doctor for more information.  When should you call for help?  Call 911 anytime you think you may need emergency care. For example, call if:  You have sudden, severe pain in your belly or pelvis.  Call your doctor now or seek immediate medical care if:  You have new belly or pelvic pain.  You have a fever.  You have new or increased burning or pain with urination, or you cannot urinate.  You have pain, swelling, or tenderness in the scrotum.  You are pregnant and have any symptoms of an STI.  Watch closely for changes in your health, and be sure to contact your doctor if:  You have unusual vaginal bleeding.  You have a discharge from the vagina, penis, or anus.  You have any new symptoms, such as sores, bumps, rashes, blisters,  "or warts in the genital or anal area.  You have itching, tingling, pain, or burning in the genital or anal area.  You think you may have been exposed again to an STI.  You have a sore throat or sores in your mouth or on your tongue.  Where can you learn more?  Go to https://www.[employer].net/patiented  Enter M049 in the search box to learn more about \"Exposure to Sexually Transmitted Infections: Care Instructions.\"  Current as of: April 30, 2024  Content Version: 14.5    5904-5151 [employer].   Care instructions adapted under license by your healthcare professional. If you have questions about a medical condition or this instruction, always ask your healthcare professional. [employer] disclaims any warranty or liability for your use of this information.    Pily,    Thank you for choosing us for your care. I have placed an order for a lab test(s). This would include bloodwork(if desired), vaginal and oral swabs and urine. The urine needs tested to assess for UTI. You just need to set up a lab only appointment. Based on your results I will be able to provide treatment. View your full visit summary for details by clicking on the link below. You can schedule a lab only appointment right here in [employer], or by calling [phone].    You will receive your lab results and next steps via [employer] when the lab results return.    Sincerely,       TRELL Omalley CNP     "

## 2025-07-30 NOTE — TELEPHONE ENCOUNTER
Provider E-Visit time total (minutes):  10 min so far      Healthy regards,            Sheila Acuna, FNP-BC

## 2025-08-05 DIAGNOSIS — Z86.39 HISTORY OF OBESITY IN ADULTHOOD: ICD-10-CM

## 2025-08-05 RX ORDER — SEMAGLUTIDE 0.25 MG/.5ML
INJECTION, SOLUTION SUBCUTANEOUS
Qty: 6 ML | Refills: 2 | Status: SHIPPED | OUTPATIENT
Start: 2025-08-05